# Patient Record
Sex: MALE | Race: AMERICAN INDIAN OR ALASKA NATIVE
[De-identification: names, ages, dates, MRNs, and addresses within clinical notes are randomized per-mention and may not be internally consistent; named-entity substitution may affect disease eponyms.]

---

## 2018-07-14 ENCOUNTER — HOSPITAL ENCOUNTER (INPATIENT)
Dept: HOSPITAL 42 - ED | Age: 83
LOS: 3 days | Discharge: HOME | DRG: 281 | End: 2018-07-17
Attending: FAMILY MEDICINE | Admitting: FAMILY MEDICINE
Payer: MEDICARE

## 2018-07-14 VITALS — BODY MASS INDEX: 27.8 KG/M2

## 2018-07-14 DIAGNOSIS — I25.10: ICD-10-CM

## 2018-07-14 DIAGNOSIS — J44.9: ICD-10-CM

## 2018-07-14 DIAGNOSIS — C34.90: ICD-10-CM

## 2018-07-14 DIAGNOSIS — I13.0: ICD-10-CM

## 2018-07-14 DIAGNOSIS — D68.69: ICD-10-CM

## 2018-07-14 DIAGNOSIS — Z79.899: ICD-10-CM

## 2018-07-14 DIAGNOSIS — N18.9: ICD-10-CM

## 2018-07-14 DIAGNOSIS — E78.00: ICD-10-CM

## 2018-07-14 DIAGNOSIS — K21.9: ICD-10-CM

## 2018-07-14 DIAGNOSIS — R09.02: ICD-10-CM

## 2018-07-14 DIAGNOSIS — F03.90: ICD-10-CM

## 2018-07-14 DIAGNOSIS — I21.4: Primary | ICD-10-CM

## 2018-07-14 DIAGNOSIS — G45.9: ICD-10-CM

## 2018-07-14 DIAGNOSIS — Z87.01: ICD-10-CM

## 2018-07-14 DIAGNOSIS — I48.91: ICD-10-CM

## 2018-07-14 DIAGNOSIS — Z87.442: ICD-10-CM

## 2018-07-14 DIAGNOSIS — I50.9: ICD-10-CM

## 2018-07-14 DIAGNOSIS — D64.9: ICD-10-CM

## 2018-07-14 DIAGNOSIS — Z90.49: ICD-10-CM

## 2018-07-14 DIAGNOSIS — Z79.82: ICD-10-CM

## 2018-07-14 DIAGNOSIS — Z79.01: ICD-10-CM

## 2018-07-14 DIAGNOSIS — Z87.891: ICD-10-CM

## 2018-07-14 LAB
ALBUMIN SERPL-MCNC: 3.8 G/DL (ref 3–4.8)
ALBUMIN/GLOB SERPL: 1.3 {RATIO} (ref 1.1–1.8)
ALT SERPL-CCNC: 22 U/L (ref 7–56)
APTT BLD: 33.7 SECONDS (ref 25.1–36.5)
AST SERPL-CCNC: 19 U/L (ref 17–59)
BASOPHILS # BLD AUTO: 0.01 K/MM3 (ref 0–2)
BASOPHILS NFR BLD: 0.1 % (ref 0–3)
BUN SERPL-MCNC: 34 MG/DL (ref 7–21)
CALCIUM SERPL-MCNC: 11.6 MG/DL (ref 8.4–10.5)
EOSINOPHIL # BLD: 0.1 10*3/UL (ref 0–0.7)
EOSINOPHIL NFR BLD: 0.7 % (ref 1.5–5)
ERYTHROCYTE [DISTWIDTH] IN BLOOD BY AUTOMATED COUNT: 15.5 % (ref 11.5–14.5)
GFR NON-AFRICAN AMERICAN: 41
GRANULOCYTES # BLD: 6.3 10*3/UL (ref 1.4–6.5)
GRANULOCYTES NFR BLD: 75.5 % (ref 50–68)
HDLC SERPL-MCNC: 57 MG/DL (ref 29–60)
HGB BLD-MCNC: 15.8 G/DL (ref 14–18)
INR PPP: 2.71 (ref 0.93–1.08)
LDLC SERPL-MCNC: 56 MG/DL (ref 0–129)
LYMPHOCYTES # BLD: 1.1 10*3/UL (ref 1.2–3.4)
LYMPHOCYTES NFR BLD AUTO: 13.4 % (ref 22–35)
MCH RBC QN AUTO: 27.2 PG (ref 25–35)
MCHC RBC AUTO-ENTMCNC: 34.3 G/DL (ref 31–37)
MCV RBC AUTO: 79.3 FL (ref 80–105)
MONOCYTES # BLD AUTO: 0.9 10*3/UL (ref 0.1–0.6)
MONOCYTES NFR BLD: 10.3 % (ref 1–6)
PLATELET # BLD: 129 10^3/UL (ref 120–450)
PMV BLD AUTO: 10.4 FL (ref 7–11)
PROTHROMBIN TIME: 31.8 SECONDS (ref 9.4–12.5)
RBC # BLD AUTO: 5.81 10^6/UL (ref 3.5–6.1)
TROPONIN I SERPL-MCNC: 0.13 NG/ML
WBC # BLD AUTO: 8.4 10^3/UL (ref 4.5–11)

## 2018-07-14 RX ADMIN — IPRATROPIUM BROMIDE AND ALBUTEROL SULFATE PRN ML: .5; 3 SOLUTION RESPIRATORY (INHALATION) at 17:18

## 2018-07-14 RX ADMIN — IPRATROPIUM BROMIDE AND ALBUTEROL SULFATE SCH: .5; 3 SOLUTION RESPIRATORY (INHALATION) at 21:05

## 2018-07-14 NOTE — CT
Date of service: 



07/14/2018



PROCEDURE:  CT HEAD WITHOUT CONTRAST.



HISTORY:

Code Stroke



COMPARISON:

None available. 



TECHNIQUE:

Axial computed tomography images were obtained through the head/brain 

without intravenous contrast.  



Radiation dose:



Total exam DLP = 969 mGy-cm.



This CT exam was performed using one or more of the following dose 

reduction techniques: Automated exposure control, adjustment of the 

mA and/or kV according to patient size, and/or use of iterative 

reconstruction technique.



FINDINGS:



HEMORRHAGE:

No intracranial hemorrhage. 



BRAIN:

No mass effect or edema.  Moderate atrophy



VENTRICLES:

Unremarkable. No hydrocephalus. 



CALVARIUM:

Unremarkable.



PARANASAL SINUSES:

Unremarkable as visualized. No significant inflammatory changes.



MASTOID AIR CELLS:

Unremarkable as visualized. No inflammatory changes.



OTHER FINDINGS:

The ER was notified at 11:15 a.m.



IMPRESSION:

No acute findings

## 2018-07-14 NOTE — CARD
--------------- APPROVED REPORT --------------





Date of service: 07/14/2018



EKG Measurement

Heart [id number]

CT [id number]

[id number]

[id number]

[id number]



<Conclusion>

Sinus rhythm with 1st degree AV block

Left axis deviation

Right bundle branch block

Abnormal ECG

## 2018-07-14 NOTE — CON
DATE:  07/14/2018



HISTORY OF PRESENT ILLNESS:  The patient is an 86-year-old male who

presents with nondescript neurologic changes this morning with an episode

of atypical chest pain.



PAST MEDICAL HISTORY:  The patient's past medical history is notable for

documented coronary disease.  He also suffers from hypercholesterolemia.



He has had a lung mass that was noted that is consistent with CA, has been

treated conservatively.



SOCIAL HISTORY:  The patient is a former smoker.



REVIEW OF SYSTEMS:  The patient is comfortable in the emergency room

without shortness of breath, without chest pain, without edema in the lower

extremities, without dizziness, without orthopnea.



PHYSICAL EXAMINATION:

VITAL SIGNS:  On physical exam, blood pressure is 119/77, heart rate is in

the 80s.

NECK:  Negative JVD.

LUNGS:  Without rales.

HEART:  S1, S2.

EXTREMITIES:  Without edema.



LABORATORY DATA:  EKG shows no acute changes.  BUN and creatinine is 34 and

1.6.  Troponin is 0.13.  Hemoglobin is 15.



IMPRESSION:

1.  Nonspecific neurologic symptoms.

2.  No evidence for acute stroke.

3.  Mildly elevated troponins consistent with non-ST elevation myocardial

infarction.

4.  Lung mass.

5.  History of hypercholesterolemia.



Given these findings, the patient is anticoagulated on his Coumadin

already.



Aspirin has been ordered.  Beta-blockers has been ordered.





__________________________________________

Shaun Beebe MD







DD:  07/14/2018 12:46:42

DT:  07/14/2018 12:49:43

Job # [id number]

## 2018-07-14 NOTE — EDPD
HPI Stroke





- General


Time Seen by Provider: 07/14/18 10:22


Historian: Patient, Family





- History of Present Illness


Narrative History of Present Illness (Free Text): 





07/14/18 15:17


Patient is an 87 yo male, past medical history of lung cancer (family and 

patient decided "not to treat") presents with episode of shortness of breath 

and weakness "while in the shower" at approximatley 830 am. Patient was found 

by wife to be "very weak". When patient was seen by his daughter at 

approximately 900 am he was "slurring his speech". He initially denies headache

, denies chest pain, denies weakness to any specific arm or leg. Daughter 

states that his speech is now back to normal. No abdominal pain. No nausea. No 

dark or bloody stools.





rTPA Inclusion/Exclusion





- Refusal of Treatment


Patient Refused Treatment: Yes





- Warning to TPA With Conditions


Condition: Rapid Improvement





Allergies/Home Meds


Allergies/Adverse Reactions: 


Allergies





No Known Allergies Allergy (Verified 07/29/13 08:23)


 








Home Medications: 


 Home Meds











 Medication  Instructions  Recorded  Confirmed


 


Warfarin [Coumadin] 3 mg PO DAILY 05/19/16 07/14/18


 


Albuterol HFA [Ventolin HFA 90 0.09 mg IH BID 07/14/18 07/14/18





mcg/actuation (8 g)]   


 


Albuterol/Ipratropium [Duoneb 3 3 ml IH BID 07/14/18 07/14/18





MG/3 Ml-0.5 MG/3 Ml 3 Ml]   


 


Cholecalciferol [Vitamin D] 1,000 iu PO DAILY 07/14/18 07/14/18


 


Furosemide [Lasix] 20 mg PO DAILY 07/14/18 07/14/18


 


Metoprolol Succinate [Toprol Xl] 25 mg PO DAILY 07/14/18 07/14/18


 


Rivastigmine 9.5 mg/24 hr [Exelon 1 ea TD DAILY 07/14/18 07/14/18





9.5 mg Patch]   


 


traMADol [Ultram] 50 mg PO PRN PRN 07/14/18 07/14/18














Review of Systems





- Review of Systems


Constitutional: Fatigue.  absent: Fevers


Eyes: absent: Vision Changes


ENT: absent: Hearing Changes


Respiratory: SOB.  absent: Cough


Cardiovascular: absent: Chest Pain, Palpitations, Edema, MCCALL


Gastrointestinal: absent: Abdominal Pain, Nausea, Vomiting


Genitourinary Male: absent: Dysuria


Musculoskeletal: absent: Back Pain


Skin: absent: Rash


Neurological: Dizziness, Speech Changes.  absent: Headache, Focal Weakness, 

Seizure


Endocrine: absent: Polyuria


Hemo/Lymphatic: absent: Easy Bleeding


Psychiatric: absent: Depression





ED Stroke Physical Exam


Vital Signs Reviewed: Yes


Temperature: Afebrile


Appearance: Positive for: Ill-Appearing


Pain Distress: Mild


Mental Status: Positive for: Alert and Oriented X 3





- Systems Exam


Head: Present: Atraumatic, Normocephalic


Pupils: Present: PERRL


Mouth: Present: Dry


Pharnyx: No: ERYTHEMA


Nose (Internal): Present: Normal Inspection


Neck: Present: Normal Range of Motion.  No: Meningeal Signs, JVD


Respiratory/Chest: Present: Rales, Rhonchi.  No: Respiratory Distress, 

Tachypneic


Cardiovascular: Present: Regular Rate and Rhythm, Murmurs


Abdomen: No: Tenderness, Distention


Rectal: No: Gross Blood


Back: No: CVA Tenderness


Upper Extremity: No: Edema


Lower Extremity: Present: Neurovascularly Intact.  No: CALF TENDERNESS


Neurologic: Present: Speech Normal, Motor Func Grossly Intact, Normal Sensory 

Function, Memory Normal.  No: Pronator Drift, Facial Droop, Dysmetric Finger to 

Nose


Skin: Present: Warm


Psychiatric: Present: Alert, Normal Insight, Normal Concentration





Medical Decision Making


ED Course and Treatment: 





07/14/18 10:30


Patient is an 87 yo male who presented with wife and daughter. History 

supplemented by daughter.


He was noted to have slurred speech by daughter at approximately 900 am today 

after wife had reported that she did not appreciate this slurred speech at 830.


CODE STROKE ACTIVATED





07/14/18 11:53


CT of Head reviewed by radiologist, shows no acute findings,


Chest X-Ray reviewed by radiologist, shows no active disease. 


Although my interpretation of chest xray is left lower mass/infiltrate.


Patient on re-evaulation is at his baseline mental status with normal speech on 

re-evaluation.


He is NOT a tpa candidate as patient has rapid improvement and resolution of 

symptoms.


Patient evaluated by Dr. Jarrell neuro in ED.


Patient denies chest pain or shortness of breath with serial exams.


EKG reveals right bundle branch block with first degree av block.


No prior EKG for comparison.


Troponin elevated at 0.12.


He has no chest pain with serial exams.


Suspect possible nonstemi, cannot exclude PE.


PATIENT IS TAKING COUMADIN AND INR IS 2.7.


No active bleeding noted.


Patient evaluated by his cardiologist in ED as well as PMD Dr. BUBBA Reed.


Differential diagnosis reviewed with admitting team, will endorse vq scan to 

admitting team.


Patient's family updated with results, he is comfortable and denying shortness 

of breath.


07/14/18 15:45








- EKG Interpretation


EKG Interpretation (Text): 





EKG at 11:17 normal sinus rhythm with first degree av block rate of 72, left 

axis deviation, right bundle branch block





Interpreted by ED Physician: Yes


Type: 12 lead EKG





NIHSS Scale ([place])


Time Performed: 10:45





- How Severe is the Stoke


  ** Baseline


Level of Consciousness: 0=Alert


LOC to Questions: 0=Both comments correct


LOC to commands: 0=Obeys both correctly


Best Gaze: 0=Normal


Visual: 0=No visual loss


Facial: 0=Normal


Motor Arm - Left: 0=No drift


Motor Arm - Right: 0=No drift


Motor Leg - Left: 0=No drift


Motor Leg - Right: 0=No drift


Limb Ataxia: 0=Absent


Sensory: 0=Normal


Best Language: 0=No aphasia


Dysarthia: 0=Normal articulation


Extinction & Inattention (Neglect): 0=Normal, no object


Score: 0


Risk Level: No Stroke Risk





Disposition/Present on Arrival





- Present on Arrival


Any Indicators Present on Arrival: Yes


History of DVT/PE: Yes





- Disposition


Have Diagnosis and Disposition been Completed?: Yes


Diagnosis: 


 Elevated troponin, TIA (transient ischemic attack), Dyspnea, Myocardial 

infarction, Renal insufficiency





Disposition: HOSPITALIZED


Disposition Time: 12:30


Patient Plan: Admission, Telemetry


Patient Problems: 


 Current Active Problems











Problem Status Onset


 


Dyspnea Acute  


 


Elevated troponin Acute  


 


Myocardial infarction Acute  


 


TIA (transient ischemic attack) Acute  











Condition: SERIOUS

## 2018-07-14 NOTE — RAD
Date of service: 



07/14/2018



HISTORY:

Code Stroke  



COMPARISON:

No prior. 



FINDINGS:



LUNGS:

No active pulmonary disease.



PLEURA:

No significant pleural effusion identified, no pneumothorax apparent.



CARDIOVASCULAR:

Normal.



OSSEOUS STRUCTURES:

No significant abnormalities.



VISUALIZED UPPER ABDOMEN:

Normal.



OTHER FINDINGS:

None.



IMPRESSION:

No active disease.

## 2018-07-15 LAB
ALBUMIN SERPL-MCNC: 3.4 G/DL (ref 3–4.8)
ALBUMIN/GLOB SERPL: 1.2 {RATIO} (ref 1.1–1.8)
ALT SERPL-CCNC: 29 U/L (ref 7–56)
AST SERPL-CCNC: 21 U/L (ref 17–59)
BUN SERPL-MCNC: 30 MG/DL (ref 7–21)
CALCIUM SERPL-MCNC: 11.1 MG/DL (ref 8.4–10.5)
ERYTHROCYTE [DISTWIDTH] IN BLOOD BY AUTOMATED COUNT: 15.2 % (ref 11.5–14.5)
GFR NON-AFRICAN AMERICAN: 48
HGB BLD-MCNC: 14.7 G/DL (ref 14–18)
MCH RBC QN AUTO: 26.6 PG (ref 25–35)
MCHC RBC AUTO-ENTMCNC: 33.8 G/DL (ref 31–37)
MCV RBC AUTO: 78.8 FL (ref 80–105)
PLATELET # BLD: 133 10^3/UL (ref 120–450)
PMV BLD AUTO: 10.5 FL (ref 7–11)
RBC # BLD AUTO: 5.52 10^6/UL (ref 3.5–6.1)
WBC # BLD AUTO: 7.9 10^3/UL (ref 4.5–11)

## 2018-07-15 RX ADMIN — IPRATROPIUM BROMIDE AND ALBUTEROL SULFATE SCH ML: .5; 3 SOLUTION RESPIRATORY (INHALATION) at 19:20

## 2018-07-15 RX ADMIN — IPRATROPIUM BROMIDE AND ALBUTEROL SULFATE PRN ML: .5; 3 SOLUTION RESPIRATORY (INHALATION) at 17:48

## 2018-07-15 RX ADMIN — IPRATROPIUM BROMIDE AND ALBUTEROL SULFATE SCH ML: .5; 3 SOLUTION RESPIRATORY (INHALATION) at 01:05

## 2018-07-15 RX ADMIN — IPRATROPIUM BROMIDE AND ALBUTEROL SULFATE SCH ML: .5; 3 SOLUTION RESPIRATORY (INHALATION) at 07:24

## 2018-07-15 RX ADMIN — IPRATROPIUM BROMIDE AND ALBUTEROL SULFATE PRN ML: .5; 3 SOLUTION RESPIRATORY (INHALATION) at 10:53

## 2018-07-15 RX ADMIN — METHYLPREDNISOLONE SODIUM SUCCINATE SCH MG: 40 INJECTION, POWDER, FOR SOLUTION INTRAMUSCULAR; INTRAVENOUS at 13:44

## 2018-07-15 RX ADMIN — BUDESONIDE SCH MG: 0.5 SUSPENSION RESPIRATORY (INHALATION) at 19:19

## 2018-07-15 RX ADMIN — METHYLPREDNISOLONE SODIUM SUCCINATE SCH MG: 40 INJECTION, POWDER, FOR SOLUTION INTRAMUSCULAR; INTRAVENOUS at 21:54

## 2018-07-15 RX ADMIN — IPRATROPIUM BROMIDE AND ALBUTEROL SULFATE SCH ML: .5; 3 SOLUTION RESPIRATORY (INHALATION) at 13:18

## 2018-07-15 RX ADMIN — METOPROLOL SUCCINATE SCH MG: 25 TABLET, EXTENDED RELEASE ORAL at 10:57

## 2018-07-15 NOTE — HP
HISTORY OF PRESENT ILLNESS:  I saw him in the emergency room on 07/14/2018,

and I dictated an H and P, but it did not populate.  I am re-dictating the

H and P on 07/15/2018, for 07/14/2018, when I saw him in the emergency

room.  He is an 86-year-old -American man whom I know very well from

house calls for many years.  He has a history of lung cancer and the family

has tried not to treat.  He has episodes of shortness of breath.  He is

supposed to be on oxygen at home 24 x 7, but the biggest problem is he

takes it off all the time.  He then had some slurred speech at home.  No

specific weakness.  They took him to the emergency room.  I understand he

became code stroke without deficits.



PAST MEDICAL HISTORY:  Lung cancer; COPD; low vitamin D; CHF; hypertension;

dementia; low back pain; atrial fibrillation, on Coumadin.



ALLERGIES:  HE HAS NO KNOWN DRUG ALLERGIES.



REVIEW OF SYSTEMS:  At this time, he is pleasant in the emergency room.  No

deficits.  Alert, comfortable.  No chest pain.  No shortness of breath.  No

abdominal pain.  No fatigue.  No fevers.  No vision or hearing changes.  He

is not short of breath anymore.  No cough.  Now, he is on oxygen.  No chest

pain or palpitation.  No edema, dyspnea on exertion.  No nausea, vomiting,

constipation, or diarrhea.  No problems urinating.  No back pain.  No

issues with skin issues.  He had dizziness.  He had some speech changes

early when he was off the oxygen.  He normally sats between 92% and 93% at

home on 2 to 3 L of oxygen.  No headache or focal weakness.  No problems

urinating.  No bleeding.  No depression.  He is pleasantly confused.



PHYSICAL EXAMINATION:

VITAL SIGNS:  He had a 97.8 temperature, 74 pulse, 112/74 blood pressure,

90% O2 sat on room air.

HEENT:  His head is atraumatic, normocephalic.  Extraocular muscles are

intact.  Pupils are equal and reactive to light.  Throat is dry.

HEART:  Regular rate at this time.

LUNGS:  Decreased breath sounds, but clear to auscultation.  No wheezes. 

No rhonchi.  No rales.

NECK:  Supple.

ABDOMEN:  Soft, nontender.  Positive bowel sounds.  No guarding.  No

rebound.  No CVA tenderness.

RECTAL:  He has had a rectal exam in the ER with no blood.

EXTREMITIES:  No edema.  He can move all 4 extremities well.

NEUROLOGIC:  Speech is normal.  Grossly intact.  This is his baseline.  By

the time I got to him, I missed all the slurred speech  which was

happened before when he was off his oxygen.  He is alert.  Normal insight. 

He has some dementia, but he is back to his baseline.



LABORATORY DATA:  CAT scan showed no acute findings.  EKG was normal sinus

rhythm.  Score on the stroke protocol was 0.  No stroke risk at this time. 

He did have a 140 sodium, potassium 4.5.  BUN 34, creatinine 1.6.  GFR is

41.  Sugar is 98.  Calcium is 11.6.  Total bili is 0.9, AST is 19, ALT is

22, alk phos 73.  His troponin's are 0.13.  Total protein 6.8, albumin 3.8,

globulin 3.  Triglyceride 70, cholesterol 133.  White count is 8.4,

hemoglobin 15.8, hematocrit is 129.  INR is good at 2.71.



He will have consults with Cardiology and Neurology.  He will be put on

aspirin, Coumadin.  He has nebulizer treatment.  He has Exelon patch,

Lasix, Lipitor, metoprolol, and Ultram.  He is here for a change in

mentation, possible stroke, unlikely positive troponin's and shortness of

breath.  He has a history of lung cancer and change in mentation.







__________________________________________

Tae Reed DO



DD:  07/15/2018 8:47:01

DT:  07/15/2018 10:12:11

Job # [id number]

MTDD

## 2018-07-15 NOTE — NM
Date of service: 



07/14/2018



COMPARISON:





TECHNIQUE:

33.0 mCi technetium 99-m  DTPA aerosol. 



4.0 mCI technetium 99-m MAA administered intravenously.



FINDINGS:



VENTILATION COMPONENT:

Heterogeneous distribution consistent with COPD



PERFUSION COMPONENT:

Normal.



The V rad report indicated an intermediate probability of pulmonary 

embolus. I believe the scan is low probability. In particular the 

perfusion study shows no significant defects 



IMPRESSION:

Lowprobability ventilation perfusion scan for pulmonary embolism.

## 2018-07-15 NOTE — CP.PCM.CON
History of Present Illness





- History of Present Illness


History of Present Illness: 





   Patient came in as a code stroke yesterday, and patient was seen about 15 

minutes after code was called. On my examination, 's symptoms had 

resolved and patient was not a TPA candidate. Today he is neurologically the 

same, with no deficits. His history is as follows:  was well until 

yesterday afternoon, when he was observed to have dysarthria, which was 

resolved by the time he came to the ER. The patient has dementia and he is not 

able to give a good history but it is obtained from the family. He is an 86 yr 

old male with pmh of lung cancer ( not receiving any treatment ), who had 

weakness and dysarthria while in the shower at 830 am, about 2 hours before 

presenting to the ER. His wife found him to be quite weak, and he was brought 

to the hospital. There is no history of prior stroke, head trauma or mva. He 

has not had an MRI Brain in the past. Of note, the patient is on coumadin for a 

DVT. 





PMH/PSH: as above. 


FH/SH: no tobacco, no etoh.  . Has several children. 


All: nkda. 





on exam: 


MMS: 20/30. Has difficulty with calculations and the date, as well as written 

commands. There is no alexia, however. 


PERRL. CN 2-12 normal. speech fluent. can name and repeat. Motor: strength 

normal. 


Sensory: normal. no deficits. 


gait not tested. 


+2 dtr ul and ll bl. Toes downgoing. No clonus. 


no facial asymmetry. '


NIH stroke scale: 0











Past Patient History





- Infectious Disease


Hx of Infectious Diseases: None





- Past Social History


Smoking Status: Never Smoked





- CARDIAC


Hx Cardiac Disorders: Yes (cad)


Other/Comment: DVT rt leg





- PULMONARY


Hx Respiratory Disorders: Yes (lung ca dx 1/22/18 [facility] [place])


Hx Chronic Obstructive Pulmonary Disease (COPD): Yes


Hx Emphysema: Yes


Hx Pneumonia: Yes


Other/Comment: family and patient decided not to treat





- NEUROLOGICAL


Hx Neurological Disorder: No





- HEENT


Hx HEENT Problems: Yes (eyeglasses)





- RENAL


Hx Chronic Kidney Disease: Yes


Hx Kidney Stones: Yes





- ENDOCRINE/METABOLIC


Hx Endocrine Disorders: No





- HEMATOLOGICAL/ONCOLOGICAL


Hx Blood Disorders: Yes


Hx Cancer: Yes (lll lung ca)





- INTEGUMENTARY


Hx Dermatological Problems: No





- MUSCULOSKELETAL/RHEUMATOLOGICAL


Hx Falls: No





- GASTROINTESTINAL


Hx Gastrointestinal Disorders: Yes


Hx Gastroesophageal Reflux: Yes





- GENITOURINARY/GYNECOLOGICAL


Hx Genitourinary Disorders: No





- PSYCHIATRIC


Hx Substance Use: No





- SURGICAL HISTORY


Hx Surgeries: Yes


Hx Cardiac Catheterization: Yes ("yrs ago")


Hx Cholecystectomy: Yes





- ANESTHESIA


Hx Anesthesia: No





Meds


Allergies/Adverse Reactions: 


 Allergies











Allergy/AdvReac Type Severity Reaction Status Date / Time


 


No Known Allergies Allergy   Verified 07/29/13 08:23














- Medications


Medications: 


 Current Medications





Albuterol/Ipratropium (Duoneb 3 Mg/0.5 Mg (3 Ml) Ud)  3 ml IH [id number] [facility]


   Last Admin: 07/15/18 07:24 Dose:  3 ml


Albuterol/Ipratropium (Duoneb 3 Mg/0.5 Mg (3 Ml) Ud)  3 ml IH Q2H PRN


   PRN Reason: Shortness of Breath


   Last Admin: 07/14/18 17:18 Dose:  3 ml


Aspirin (Aspirin Chewable)  81 mg PO DAILY [facility]


Atorvastatin Calcium (Lipitor)  20 mg PO DIN [facility]


   Last Admin: 07/14/18 17:15 Dose:  20 mg


Furosemide (Lasix)  20 mg IVP DAILY [facility]


   Last Admin: 07/14/18 12:31 Dose:  20 mg


Metoprolol Succinate (Toprol Xl)  25 mg PO DAILY [facility]


Rivastigmine (Exelon 9.5 Mg/24 Hr Patch)  1 patch TD DAILY [facility]


Tramadol HCl (Ultram)  50 mg PO DAILY PRN


   PRN Reason: Pain, severe (8-10)


   Last Admin: 07/14/18 21:07 Dose:  50 mg


Warfarin Sodium (Coumadin)  5 mg PO DAILY [facility]


   PRN Reason: Protocol











Results





- Vital Signs


Recent Vital Signs: 


 Last Vital Signs











Temp  97.3 F L  07/15/18 06:00


 


Pulse  76   07/15/18 06:00


 


Resp  19   07/15/18 06:00


 


BP  114/62   07/15/18 06:00


 


Pulse Ox  94 L  07/15/18 06:00














- Labs


Result Diagrams: 


 07/15/18 06:30





 07/15/18 06:45


Labs: 


 Laboratory Results - last 24 hr











  07/14/18 07/14/18 07/15/18





  14:47 21:54 06:30


 


WBC    7.9


 


RBC    5.52


 


Hgb    14.7


 


Hct    43.5


 


MCV    78.8 L


 


MCH    26.6


 


MCHC    33.8


 


RDW    15.2 H


 


Plt Count    133


 


MPV    10.5


 


Sodium   


 


Potassium   


 


Chloride   


 


Carbon Dioxide   


 


Anion Gap   


 


BUN   


 


Creatinine   


 


Est GFR ( Amer)   


 


Est GFR (Non-Af Amer)   


 


POC Glucose (mg/dL)   


 


Random Glucose   


 


Calcium   


 


Total Bilirubin   


 


AST   


 


ALT   


 


Alkaline Phosphatase   


 


Troponin I  0.13 H*  0.12 


 


Total Protein   


 


Albumin   


 


Globulin   


 


Albumin/Globulin Ratio   














  07/15/18 07/15/18





  06:45 07:14


 


WBC  


 


RBC  


 


Hgb  


 


Hct  


 


MCV  


 


MCH  


 


MCHC  


 


RDW  


 


Plt Count  


 


MPV  


 


Sodium  137 


 


Potassium  4.2 


 


Chloride  109 H 


 


Carbon Dioxide  20 L 


 


Anion Gap  13 


 


BUN  30 H 


 


Creatinine  1.4 


 


Est GFR ( Amer)  58 


 


Est GFR (Non-Af Amer)  48 


 


POC Glucose (mg/dL)   82


 


Random Glucose  88 


 


Calcium  11.1 H 


 


Total Bilirubin  0.9 


 


AST  21 


 


ALT  29 


 


Alkaline Phosphatase  61 


 


Troponin I  


 


Total Protein  6.2 


 


Albumin  3.4 


 


Globulin  2.9 


 


Albumin/Globulin Ratio  1.2 














- Imaging and Cardiology


  ** CT scan - head


Status: Image reviewed by me, Report reviewed by me (normal)





Assessment & Plan





- Assessment and Plan (Free Text)


Assessment: 





   86 yr old male who is by nature hypercoagulable due to lung cancer and also 

is on coumadin, and may have had TIA. We will do stroke workup at this time. 





Plan: 


1. ECHO


2. INR is therapeutic at 2.7


3. MRI brain tomorrow


4. Bp may be controlled. 


5. CTA head and neck would be advisable. 





If Dr. rich is on consult, please consult this group. 


Or we will follow


Thank you


Dr. summers

## 2018-07-15 NOTE — PN
DATE:  07/15/2018



SUBJECTIVE:  I saw Herson resting comfortably in bed.  He ate 100% of his

breakfast.  He is feeling well.  No chest pain.  No shortness of breath. 

No abdominal pain.  No weakness.  He can move all four extremities.  He is

taking aspirin, warfarin, DuoNeb, Exelon patch, Lasix, Lipitor, metoprolol,

and tramadol as needed.  He is in good spirits.



PHYSICAL EXAMINATION:

VITAL SIGNS:  He has a 97.8 temperature, 83 pulse, 119/73 blood pressure,

20 respiratory rate, 95% O2 sat on nasal cannula.

HEENT:  His head is atraumatic, normocephalic.

HEART:  Regular rate.

LUNGS:  Decreased breath sounds, but clear to auscultation.  No wheezes, no

rhonchi, no rales.

ABDOMEN:  Soft, nontender.  Positive bowel sounds.  No guarding, no

rebound, no CVA tenderness.

EXTREMITIES:  No edema.

NEUROLOGIC:  He can move all four extremities equally.  He can smile.  He

can put his tongue out midline.  He is back to his baseline, completely

normal.



LABORATORY DATA:  He had a 137 sodium, potassium 4.2, BUN is 30, creatinine

1.4, GFR is 58, sugar is 82, calcium is 11.1.  Total bilirubin is 0.9, AST

is 21, ALT is 29, alkaline phosphatase 61.  His third troponin dropped to

0.12.  Total protein is 6.2, albumin is 3.4.  INR is 2.71.  He has a 7.9

white count, 14.7 hemoglobin, 43.5 hematocrit with 138 platelets.



There are consults for Cardiology, Neurology, and Physical Therapy.  Get

him out of bed to chair.  If he does well, hopefully, maybe tomorrow we can

discharge him.  He had an non-ST elevation myocardial infarction, history

of left lower lung mass, cancer, change in mentation, positive troponins,

and he wants a code stroke  anything.







__________________________________________

Tae Rede DO



DD:  07/15/2018 8:50:23

DT:  07/15/2018 9:51:39

Job # [id number]

DENISE

## 2018-07-15 NOTE — PN
DATE:  07/15/2018



CARDIOLOGY FOLLOWUP



SUBJECTIVE:  The patient is dyspneic without chest pain.



PHYSICAL EXAMINATION:

VITAL SIGNS:  Blood pressure is 114/62 with the heart rate is in the 70s.

NECK:  Negative JVD.

LUNGS:  Decreased breath sounds with rhonchi at the bases.

HEART:  Reveals S1, S2.

EXTREMITIES:  Without edema.



LABORATORY DATA:  Hemoglobin is 14.7.  Chemistries:  Troponins are 0.012,

creatinine is _____.  Lung scan is pending.



IMPRESSION:

1.  Dyspnea.

2.  Non-ST-elevation myocardial infarction.

3.  Lung cancer.

4.  History of hypercholesterolemia.

5.  Coronary artery disease.

6.  The patient well anticoagulated on Coumadin.



PLAN:  Given these findings, the patient's probability of pulmonary

embolism is low given his full anticoagulation.  We will start him on Lasix

40 IV b.i.d.



I had an extensive discussion with the patient and daughter about how

aggressive to be in terms of his non-STEMI.  They agree and with no

invasive procedures at this time with conservative therapy only.  This

applies both to his lung CA as well as his non-STEMI.





__________________________________________

Shaun Beebe MD





DD:  07/15/2018 10:57:36

DT:  07/15/2018 11:01:00

Job # [id number]

## 2018-07-16 LAB
ALBUMIN SERPL-MCNC: 3.6 G/DL (ref 3–4.8)
ALBUMIN/GLOB SERPL: 1.3 {RATIO} (ref 1.1–1.8)
ALT SERPL-CCNC: 23 U/L (ref 7–56)
AST SERPL-CCNC: 17 U/L (ref 17–59)
BUN SERPL-MCNC: 34 MG/DL (ref 7–21)
CALCIUM SERPL-MCNC: 11.7 MG/DL (ref 8.4–10.5)
ERYTHROCYTE [DISTWIDTH] IN BLOOD BY AUTOMATED COUNT: 15.1 % (ref 11.5–14.5)
GFR NON-AFRICAN AMERICAN: 48
HGB BLD-MCNC: 15.8 G/DL (ref 14–18)
INR PPP: 4.37 (ref 0.93–1.08)
MCH RBC QN AUTO: 27.3 PG (ref 25–35)
MCHC RBC AUTO-ENTMCNC: 35.3 G/DL (ref 31–37)
MCV RBC AUTO: 77.5 FL (ref 80–105)
PLATELET # BLD: 141 10^3/UL (ref 120–450)
PMV BLD AUTO: 10.3 FL (ref 7–11)
PROTHROMBIN TIME: 51.8 SECONDS (ref 9.4–12.5)
RBC # BLD AUTO: 5.78 10^6/UL (ref 3.5–6.1)
WBC # BLD AUTO: 5.9 10^3/UL (ref 4.5–11)

## 2018-07-16 RX ADMIN — IPRATROPIUM BROMIDE AND ALBUTEROL SULFATE SCH ML: .5; 3 SOLUTION RESPIRATORY (INHALATION) at 07:39

## 2018-07-16 RX ADMIN — IPRATROPIUM BROMIDE AND ALBUTEROL SULFATE SCH: .5; 3 SOLUTION RESPIRATORY (INHALATION) at 01:08

## 2018-07-16 RX ADMIN — METHYLPREDNISOLONE SODIUM SUCCINATE SCH MG: 40 INJECTION, POWDER, FOR SOLUTION INTRAMUSCULAR; INTRAVENOUS at 06:17

## 2018-07-16 RX ADMIN — IPRATROPIUM BROMIDE AND ALBUTEROL SULFATE SCH ML: .5; 3 SOLUTION RESPIRATORY (INHALATION) at 19:40

## 2018-07-16 RX ADMIN — BUDESONIDE SCH MG: 0.5 SUSPENSION RESPIRATORY (INHALATION) at 19:40

## 2018-07-16 RX ADMIN — IPRATROPIUM BROMIDE AND ALBUTEROL SULFATE SCH ML: .5; 3 SOLUTION RESPIRATORY (INHALATION) at 14:00

## 2018-07-16 RX ADMIN — METHYLPREDNISOLONE SODIUM SUCCINATE SCH MG: 40 INJECTION, POWDER, FOR SOLUTION INTRAMUSCULAR; INTRAVENOUS at 14:27

## 2018-07-16 RX ADMIN — BUDESONIDE SCH MG: 0.5 SUSPENSION RESPIRATORY (INHALATION) at 07:39

## 2018-07-16 RX ADMIN — METHYLPREDNISOLONE SODIUM SUCCINATE SCH MG: 40 INJECTION, POWDER, FOR SOLUTION INTRAMUSCULAR; INTRAVENOUS at 21:07

## 2018-07-16 NOTE — PN
DATE:  07/16/2018



SUBJECTIVE:   He is resting in bed comfortably.  He slept very well last

night.  He is feeling much better.  No deficits.  No problems.



MEDICATIONS:  He is on aspirin, albuterol, Exelon patch, Lasix, Lipitor,

Pulmicort, Solu-Medrol 30 IV every 8 hours, I will drop it down to  20;

metoprolol and Ultram.



PHYSICAL EXAMINATION:

VITAL SIGNS:  He has a 97.4 temperature, 81 pulse, 120/80 blood pressure,

19 respiratory rate, 95% O2 sat on 5 liters.  HEENT:  His head is

atraumatic, normocephalic.

HEART:  Regular rate.

LUNGS:  Decreased breath sounds, but clear.

ABDOMEN:  Soft.

EXTREMITIES:  No edema.



LABORATORY DATA:  He has a 137 sodium, potassium is 4.9, BUN is 34,

creatinine 1.4, GFR is 48, sugar is 152, calcium is 11.7.  Total bili is

0.6, AST is 17, ALT is 23, alk phos 72.  Troponin I was less than 0.12,

albumin is 6.3.  INR went to 4.37.  I will hold the Coumadin.  He is not

bleeding.  He has 5.9 white count, 15.8 hemoglobin, 44.8 hematocrit with

141 platelets.



He has been seen by Cardiology and Neurology.  They are waiting for MRI of

the brain, 2-D echo and carotids.  I am hoping if they are okay and

physical therapy says he could walk,  he could be discharged later today

back home.



DIAGNOSES:  He came in with elevated troponins, change in mentation, left

lower lung mass which is chronic and he had an non-ST elevation myocardial

infarction.





__________________________________________

Tae Reed DO







DD:  07/16/2018 7:48:54

DT:  07/16/2018 7:50:52

Job # [id number]

## 2018-07-16 NOTE — PN
DATE:  07/16/2018



CARDIOLOGY FOLLOWUP



SUBJECTIVE:  The patient is still intermittently dyspneic.  The pressure

earlier this morning was low after Lasix.  Lasix has been held.  Currently,

the patient is in no acute distress.



PHYSICAL EXAMINATION:

VITAL SIGNS:  Blood pressure 120/80, heart rate is in the 80s.

NECK:  Negative JVD.

LUNGS:  Bilateral rhonchi.

HEART:  Reveal S1, S2.

EXTREMITIES:  Without edema.



LABORATORY DATA:  Hemoglobin is 15.8, BUN and creatinine up to 34 and 1.4,

the glucose is 152.



CT scan of the chest revealed no change in the lung mass.  There is severe

emphysema.



IMPRESSION:

1.  Severe chronic obstructive pulmonary disease.

2.  Dyspnea.

3.  Lung cancer.

4.  Non-ST-elevation myocardial infarction.

5.  Coronary artery disease.

6.  Anemia.

7.  Weakness.



PLAN:  Given these findings, we will hold off his Lasix.



In addition, I have reconfirmed with the patient's family that no invasive

cardiac procedures will be done on the patient.  We will stop his Lasix and

continue to monitor on telemetry for 24 hours.





__________________________________________

Shaun Beebe MD



DD:  07/16/2018 13:22:44

DT:  07/16/2018 13:26:00

Job # [id number]

## 2018-07-16 NOTE — CON
DATE:  07/15/2018PULMONARY CONSULTATION



LOCATION:  Room 263, bed 2.



HISTORY OF PRESENT ILLNESS:  The patient has longstanding chronic

obstructive pulmonary disease.  He was admitted to the hospital through the

emergency room for shortness of breath.  He was found to have an acute

myocardial infarction.  He has a history of lung cancer in the past,

evidently this was not treated, the time span of this is not clear.  The

patient was evidently experiencing a cerebrovascular accident at the time

of being in the emergency room.  Although, now he is awake and alert in his

room, able to answer questions fully.



PAST MEDICAL HISTORY:  History of lung cancer as described above, chronic

obstructive pulmonary disease, dementia and atrial fibrillation, on

Coumadin.



ALLERGIES:  NO KNOWN ALLERGIES.



HOME MEDICATIONS:  Unclear.



SOCIAL HISTORY:  Former smoker.  No occupational exposure.  No travel

history.



FAMILY HISTORY:  Coronary artery disease.



REVIEW OF SYSTEMS:  There were no abnormalities noted other than that

described above.  He has a history of issues with his heart in the past. 

His COPD and lung cancer is documented.  All other systems negative.



PHYSICAL EXAMINATION

GENERAL:  The patient is comfortable in bed, in no acute distress.

VITAL SIGNS:  Stable.  He remains afebrile at 98.2, pulse 70, respiratory

rate 16, blood pressure 120/70.  O2 sat 90% on room air, on supplemental

oxygen 96%.

HEENT:  Normocephalic, atraumatic.  EOMs full.  Conjunctivae pink.

NECK:  Supple.  No jugular venous distention.  No bruit, no mass, no

thyromegaly.

HEART:  Regular rhythm.  S1, S2 without murmur, gallop or rub.

CHEST:  Global decrease in breath sounds.  No wheezing noted.  Minimal

rhonchi noted.  No rales appreciated.  ABDOMEN:  Soft.  Bowel sounds

normoactive without mass, guarding, rebound or organomegaly.

EXTREMITIES:  Reveal no clubbing, cyanosis or edema.

NEUROLOGIC:  Reveals no focal findings.

SKIN:  Normal without rash or excoriation.  Lymphadenopathy is not present.



LABORATORY DATA:  Chest x-ray was clear, although underlying "lung cancer"

is not seen.  A CAT scan would be necessary to further evaluate.  EKG;

sinus rhythm, nonspecific ST-T wave changes.  Blood work shows a normal

electrolytes with a BUN of 34, creatinine of 1.6 and calcium is 11.6,  white 
count 8000.  No other laboratory 

studies are available at this time.



IMPRESSION:

1.  Underlying chronic obstructive pulmonary disease.

2.  Underlying lung cancer, not seen on chest x-ray.

3.  Possible cerebrovascular accident, on Coumadin.



PLAN:  The patient is being evaluated by Cardiology and Neurology.  We will

discuss with Dr. Tae Reed from Internal Medicine to decide on the need

for further intervention.  In the meantime, however, the patient will be on

inhaled bronchodilators and corticosteroids and CAT scan requested.



Thank you for the opportunity to evaluate this patient.





__________________________________________

Angelo Austin MD



DD:  07/16/2018 8:46:10

DT:  07/16/2018 8:50:06

Job # [id number]



MTDD

## 2018-07-16 NOTE — CARD
--------------- APPROVED REPORT --------------





Date of service: 07/16/2018



EXAM: Two-dimensional and M-mode echocardiogram with Doppler and 

color Doppler.



INDICATION

STROKE



2D DIMENSIONS 

RVDd4.5   (2.9-3.5cm)IVSd1.1   (0.7-1.1cm)

LVDd4.0   (3.9-5.9cm)PWd1.2   (0.7-1.1cm)



M-Mode DIMENSIONS 

Aortic Root3.60   (2.2-3.7cm)Aortic Cusp Exc.1.80   (1.5-2.0cm)



Aortic Valve

AoV Peak Rfxugiiy360.0cm/Terrell Peak GR.7mmHg



Mitral Valve

E/A ratio0.0



TDI

E/Lateral E'0.0E/Medial E'0.0



Pulmonary Valve

PV Peak Eziebzrv91.4cm/sPV Peak Grad.1mmHg



Tricuspid Valve

TR Peak Iymrzsjy363wc/sRAP ZFNCXIJE87kvVbCX Peak Gr.72mmHg

IUUY43tlWy



 LEFT VENTRICLE 

The left ventricle is normal size. There is normal left ventricular 

wall thickness. The left ventricular function is low normal.EF-50-55 

There is a flattened septum consistent with right ventricle volume 

and pressure overload. The left ventricular diastolic function is 

normal. No left ventricle thrombus noted on this study. There is no 

ventricular septal defect visualized. There is no left ventricular 

aneurysm. There is no mass noted in the left ventricle.



 RIGHT VENTRICLE 

The right ventricle is severely dilated. The right ventricle is 

borderline hypertrophied. Systolic function of RV is moderately to 

severely reduced.



 ATRIA 

The left atrium size is normal. The right atrium is moderately 

dilated. The interatrial septum is intact with no evidence for an 

atrial septal defect.



 AORTIC VALVE 

The aortic valve is thickened but opens well. There is trace aortic 

regurgitation. There is no aortic valvular stenosis. There is no 

aortic valvular vegetation.



 MITRAL VALVE 

The mitral valve is thickened but opens well. Mitral regurgitation is 

trace to mild. There is no mitral valve stenosis. There is no 

evidence of mitral valve prolapse.



 TRICUSPID VALVE 

The tricuspid valve leaflets are thickened , but open well. There is 

moderate to severe tricuspid regurgitation.RVSP-81 mmof hg. There is 

severe pulmonary hypertension. There is no tricuspid valve stenosis. 

There is no tricuspid valve prolapse or vegetation.



 PULMONIC VALVE 

The pulmonic valve is borderline thickened. There is mild pulmonic 

valvular regurgitation. There is no pulmonic valvular stenosis.



 GREAT VESSELS 

The aortic root is normal in size. The ascending aorta is normal in 

size. The pulmonary artery is normal. The IVC is normal in size and 

collapses >50% with inspiration.



 PERICARDIAL EFFUSION 

There is no pleural effusion. There is no pericardial effusion.



<Conclusion>

The left ventricle is normal size.

There is normal left ventricular wall thickness.

The left ventricular function is low normal.EF-50-55

There is a flattened septum consistent with right ventricle volume 

and pressure overload.

The right ventricle is severely dilated.

Systolic function of RV is moderately to severely reduced.

There is trace aortic regurgitation.

Mitral regurgitation is trace to mild.

There is moderate to severe tricuspid regurgitation.RVSP-81 mmof hg.

There is severe pulmonary hypertension.

The IVC is normal in size and collapses >50% with inspiration.

There is no pericardial effusion.

## 2018-07-16 NOTE — US
PROCEDURE:  Bilateral carotid artery duplex ultrasound 



HISTORY:

Carotid stenosis CVA



PHYSICIAN(S):  Shaun Luna MD.



TECHNIQUE:

Duplex sonography and color-flow Doppler were used to evaluate the 

carotid bifurcations and limited segments of the vertebral arteries 

bilaterally.



FINDINGS:

There is mild to moderate diffuse smooth heterogeneous plaque noted 

at the carotid bifurcations bilaterally. The peak systolic velocity 

in the proximal right internal carotid artery is 46 cm/sec. This 

corresponds to a 20 to 39% proximal right ICA stenosis. Normal 

systolic velocities are noted in the proximal right external carotid 

artery. There is antegrade flow in the right vertebral artery.



The peak systolic velocity in the proximal left internal carotid 

artery is 53 cm/sec. This corresponds to a 20 to 39% proximal left 

ICA stenosis. Normal systolic velocities are noted in the proximal 

left external carotid artery. There is antegrade flow in the left 

vertebral artery.



IMPRESSION:

1. Bilateral 20-39% proximal ICA stenoses.



2. Antegrade flow in both vertebral arteries.

## 2018-07-16 NOTE — MRI
Date of service: 



07/16/2018



PROCEDURE:  MRI BRAIN WITHOUT CONTRAST



HISTORY:

Code stroke



COMPARISON:

None. 



TECHNIQUE:

Multiplanar, multisequence MR images of the brain were obtained 

without intravenous contrast enhancement.



FINDINGS:



HEMORRHAGE:

None



DWI:

No evidence of an acute or early subacute infarction.



BRAIN PARENCHYMA:

No mass effect or edema. There is moderate to severe atrophy.



VENTRICLES:

Unremarkable. No hydrocephalus.



CRANIUM:

Unremarkable.



ORBITS:

Grossly unremarkable.



PARANASAL SINUSES/MASTOIDS:

Clear



VASCULAR SYSTEM:

Skull base flow voids intact.



OTHER FINDINGS:

None. 



IMPRESSION:

No acute intracranial findings

## 2018-07-16 NOTE — CON
DATE:  07/16/2018



NEUROLOGY CONSULTATION



CHIEF COMPLAINT:  Transient slurred speech, generalized weakness.



HISTORY OF PRESENT ILLNESS:  This is an 86-year-old man with past medical

history of left lobe lung mass, COPD, AFib, cognitive impairment, on Exelon

patch, who came in for shortness of breath, generalized weakness following

tremor and was found to be very weak and questionable slurred speech.  He

underwent an MRI of the brain without contrast which showed no acute

intracranial abnormality.  His chest x-ray showed a left lower lobe mass. 

His blood pressures were on the lower side.  In addition, he had an

underlying NSTEMI which Cardiology is on board.  He is on aspirin 81 mg in

addition to Lipitor 20 mg p.o. daily.  No acute events overnight.  No focal

neurological deficits seen on neuro examination besides some evidence of

cognitive impairment.  He is mildly deconditioned.



PAST MEDICAL HISTORY:  As above.



SOCIAL HISTORY:  No illicit drug use, smoking or EtOH abuse at this time.



ALLERGIES:  NO KNOWN DRUG ALLERGIES.



FAMILY HISTORY:  Noncontributory.



MEDICATIONS:  Reviewed by nurse per reconciliation sheet.



REVIEW OF SYSTEMS:  Fourteen-point review of systems is negative except as

in the HPI.



LABORATORY DATA:  Sodium is 137, potassium 4.9, chloride 107, carbon

dioxide of 18, BUN of 34, creatinine 1.4, random glucose of 152, calcium is

11.7, which is elevated.



PHYSICAL EXAMINATION:

VITAL SIGNS:  Temperature of 97.4, pulse rate of 88, blood pressure 120/80,

respiratory rate of 19, oxygen saturation 95% by room air.

GENERAL:  The patient is sitting up in bed, in no acute distress.

HEENT:  Head is atraumatic, normocephalic.  PERRLA.  Extraocular muscles

intact.

NECK:  Supple.  No JVD.  No adenopathy noted.

LUNGS:  Clear to auscultation.  No adventitious sounds.

HEART:  S1 and S2.  Normal rate and rhythm.  No murmurs, rub, or gallops.

ABDOMEN:  Soft, nontender, nondistended.  Bowel sounds are present.

EXTREMITIES:  No clubbing.  No cyanosis.  Peripheral pulses 2+ felt

bilaterally.

NEUROLOGIC:  The patient is alert and oriented to person and place, not

much of month and year.  Recall after 5 minutes is 0/3.  Poor attention

span.  Slow thought process.  Cranial nerves II through XII are intact. 

Motor exam:  Moves all extremities equally.  Slightly increased tone

throughout.  No pronator drift seen.  Sensory exam:  Light touch, pinprick,

proprioception, vibration are intact.  DTRs are 2+ throughout and 1 at both

ankles.  Coordination:  Finger-to-nose intact.  No dysmetria noted.  Gait

is deferred for now.



ASSESSMENT AND PLAN:  This is an 86-year-old man with history of chronic

obstructive pulmonary disease, hypertension, history of atrial fibrillation

on Coumadin, who presented for shortness of breath, generalized weakness

and questionable transient slurred speech with secondary to transient

cerebral hypoperfusion of the brain from low blood pressures, in addition

had underlying non-ST elevation myocardial infarction.  Currently, he is on

aspirin and statin.  His MRI of the brain showed no acute intracranial

abnormality.  He is _____ deconditioned of his underlying history of lung

cancer.  At this time, recommend;

1.  Aspirin 81 mg, Lipitor 20 for stroke prevention.

2.  PT/OT as an outpatient at least 3 times a week for deconditioned state.

3.  Continue with his Exelon patch for his cognitive impairment.

4.  Follow up with Oncology as an outpatient in regards to the left lower

lung mass and continue with current present medical management.  He is

stable for discharge.



Thank you for this consult.





__________________________________________

Rahul Limon MD



DD:  07/16/2018 14:27:09

DT:  07/16/2018 15:33:50

Job # [id number]

## 2018-07-16 NOTE — CT
Date of service: 



07/16/2018



PROCEDURE:  CT Chest without contrast



HISTORY:

Hx iof Lung CA



COMPARISON:

05/01/2018



TECHNIQUE:

Contiguous axial images were obtained through the chest without 

intravenous contrast enhancement. Sagittal and coronal 

reconstructions were performed.







Radiation dose (DLP): 379 mGy-cm. 



This CT exam was performed using one or more of the following dose 

reduction techniques: Automated exposure control, adjustment of the 

mA and/or kV according to patient size, and/or use of iterative 

reconstruction technique.



FINDINGS:



LUNGS:

There is a mass in the left lower lobe that is unchanged in size. In 

the coronal plane this measures 4.4 cm in diameter.



There is emphysema which is severe in the upper lobes. Multiple bulla 

are seen 



MEDIASTINUM:

Unremarkable thoracic aorta. No aneurysm. Normal sized heart. Main 

pulmonary artery unremarkable. No vascular congestion. No 

lymphadenopathy. Coronary artery calcifications are seen 



PLEURA:

No pleural fluid. No pneumothorax.



BONES:

No fracture. No destructive lesion. 



UPPER ABDOMEN:

Grossly unremarkable.



OTHER FINDINGS:

None.



IMPRESSION:

Stable appearance of left lower lobe lung mass.



Severe emphysema



No evidence of metastatic disease

## 2018-07-17 VITALS — RESPIRATION RATE: 20 BRPM | DIASTOLIC BLOOD PRESSURE: 62 MMHG | TEMPERATURE: 97.4 F | SYSTOLIC BLOOD PRESSURE: 104 MMHG

## 2018-07-17 VITALS — OXYGEN SATURATION: 95 %

## 2018-07-17 VITALS — HEART RATE: 77 BPM

## 2018-07-17 LAB
ALBUMIN SERPL-MCNC: 3.6 G/DL (ref 3–4.8)
ALBUMIN/GLOB SERPL: 1.4 {RATIO} (ref 1.1–1.8)
ALT SERPL-CCNC: 29 U/L (ref 7–56)
AST SERPL-CCNC: 25 U/L (ref 17–59)
BUN SERPL-MCNC: 39 MG/DL (ref 7–21)
CALCIUM SERPL-MCNC: 11.6 MG/DL (ref 8.4–10.5)
ERYTHROCYTE [DISTWIDTH] IN BLOOD BY AUTOMATED COUNT: 15.1 % (ref 11.5–14.5)
GFR NON-AFRICAN AMERICAN: 52
HGB BLD-MCNC: 15.7 G/DL (ref 14–18)
INR PPP: 4.73 (ref 0.93–1.08)
MCH RBC QN AUTO: 27.1 PG (ref 25–35)
MCHC RBC AUTO-ENTMCNC: 34.6 G/DL (ref 31–37)
MCV RBC AUTO: 78.3 FL (ref 80–105)
PLATELET # BLD: 149 10^3/UL (ref 120–450)
PMV BLD AUTO: 10.7 FL (ref 7–11)
PROTHROMBIN TIME: 56.1 SECONDS (ref 9.4–12.5)
RBC # BLD AUTO: 5.8 10^6/UL (ref 3.5–6.1)
WBC # BLD AUTO: 10.8 10^3/UL (ref 4.5–11)

## 2018-07-17 RX ADMIN — METHYLPREDNISOLONE SODIUM SUCCINATE SCH MG: 40 INJECTION, POWDER, FOR SOLUTION INTRAMUSCULAR; INTRAVENOUS at 05:58

## 2018-07-17 RX ADMIN — METOPROLOL SUCCINATE SCH MG: 25 TABLET, EXTENDED RELEASE ORAL at 10:13

## 2018-07-17 RX ADMIN — IPRATROPIUM BROMIDE AND ALBUTEROL SULFATE SCH ML: .5; 3 SOLUTION RESPIRATORY (INHALATION) at 07:45

## 2018-07-17 RX ADMIN — BUDESONIDE SCH MG: 0.5 SUSPENSION RESPIRATORY (INHALATION) at 07:45

## 2018-07-17 RX ADMIN — IPRATROPIUM BROMIDE AND ALBUTEROL SULFATE SCH ML: .5; 3 SOLUTION RESPIRATORY (INHALATION) at 01:35

## 2018-07-17 RX ADMIN — IPRATROPIUM BROMIDE AND ALBUTEROL SULFATE SCH ML: .5; 3 SOLUTION RESPIRATORY (INHALATION) at 13:31

## 2018-07-17 NOTE — PN
DATE:  07/17/2018



PULMONARY PROGRESS NOTE



SUBJECTIVE:  The patient is feeling relatively well, has no complaints of

acute shortness of breath.  He is hospitalized for myocardial infarction. 

He has chronic obstructive pulmonary disease.  CT scan of his chest in fact

shows a large lung mass that was not treated in the past.  The past history

is unclear.  As the patient is unable to give further history, we will need

to discuss with primary medical doctor to find out the chronology of the

COPD and lung cancer.



PHYSICAL EXAMINATION:

GENERAL:  The patient is resting comfortably in no acute distress.

VITAL SIGNS:  Remain stable.  He is afebrile.  Respiratory rate 16, O2 sat

98% on room air.

NECK:  Supple.  No JVD.  No bruit.

HEART:  Regular rhythm.  S1, S2 without murmur, gallop or rub.

CHEST:  Global decrease in breath sounds.  No wheezing appreciated. 

Minimal rhonchi is still noted.  No rales.

ABDOMEN:  Soft.  Bowel sounds normoactive without mass, guarding, rebound

or organomegaly.

EXTREMITIES:  Reveals no clubbing, cyanosis or edema.  There is no Homans'

sign.

NEUROLOGIC:  No focal findings.

SKIN:  Dry; intact. 



LABORATORY DATA:  CT scan as discussed above.  Lung cancer was not seen on

the chest x-ray, but a CT scan did prove to show a lung mass.



IMPRESSION:

1.  Chronic obstructive pulmonary disease.

2.  Underlying lung cancer.

3.  Cerebrovascular accident.

4.  Myocardial infarction.



PLAN:  We need to obtain history about the chronic obstructive pulmonary

disease and lung cancer.  This was clearly not treated in the past, the

time spent is not clear.  We will discuss with Dr. Reed and see if there

is any additional information regarding this.  If the diagnosis was made,

then no diagnostic intervention is required.  If not, the patient will

require a tissue diagnosis of this lung mass.  Once the myocardial

infarction is stabilized, we will discuss this with you at length and

decide on the need for further evaluation and treatment at the appropriate

time in view of an acute myocardial infarction.







__________________________________________

Angelo Austin MD



DD:  07/17/2018 9:14:38

DT:  07/17/2018 9:17:10

Job # [id number]



MTDROSALIA

## 2018-07-17 NOTE — DS
HISTORY OF PRESENT ILLNESS:  He is resting comfortable out of bed to chair.

No chest pain.  No shortness of breath.  No abdominal pain.  He is eating

well.  He is walking well.  He is doing better than when he came in.



PHYSICAL EXAMINATION:

VITAL SIGNS:  He has a 97.6 temp, 87 pulse, 118/84 blood pressure, 18

respiratory rate, 95% O2 sat on room air.

HEENT:  His head is atraumatic, normocephalic.

HEART:  Regular rate.

LUNGS:  Decreased breath sounds, but clear.

ABDOMEN:  Soft.

EXTREMITIES:  No edema.



MEDICATIONS:  He is currently on aspirin; DuoNebs; Exelon; Lipitor;

Pulmicort; Solu-Medrol, which we could stop and put him on prednisone;

and Ultram.



LABORATORY DATA:  He has a 10.8 white count, 15.7 hemoglobin, 45.4

hematocrit with 149 platelets.  He has a 135 sodium, potassium 4.7, BUN 39,

creatinine 1.3, GFR is 52, sugar is 141, calcium is 11.6, AST is 25, ALT is

29, alk phos is 69.



ASSESSMENT AND PLAN:  Waiting for Dr. Beebe to come in.  If it is okay with

Dr. Beebe, we could discharge him home.  I think that would be fine.  He

will stop the Solu-Medrol.  I will put him on prednisone and I will follow

up on the outpatient when house calls.  He was here for change in

mentation.  I think he just had hypoxemia and a non-ST-elevation myocardial

infarction.





__________________________________________

Tae Reed DO





DD:  07/17/2018 10:46:39

DT:  07/17/2018 22:14:05

Job # [id number]

MTDROSALIA

## 2018-07-17 NOTE — PN
DATE:  07/17/2018



CARDIOLOGY FOLLOWUP



SUBJECTIVE:  The patient is without shortness of breath today.  No chest

pain noted.



PHYSICAL EXAMINATION:

VITAL SIGNS:  Blood pressure 104/62, the heart rate is in the 80s.

NECK:  Negative JVD.

LUNGS:  Without rales.

HEART:  Reveals S1, S2.

EXTREMITIES:  Without edema.



LABORATORY DATA:  Hemoglobin is 15.7.  Chemistries:  BUN and creatinine are

39 and 1.3.  Glucose is 141.



IMPRESSION:

1.  _____.

2.  Lung mass consistent with cancer.

3.  Coronary artery disease.

4.  Dyspnea which is now resolved.

5.  Recent non-ST-elevation myocardial infarction

6.  Anemia.



Given these findings, we will continue the patient's treatment of his

non-STEMI medically.  No plans for intervention.







__________________________________________

Shaun Beebe MD



DD:  07/17/2018 13:40:31

DT:  07/17/2018 13:43:32

Job # [id number]

## 2018-08-18 ENCOUNTER — HOSPITAL ENCOUNTER (OUTPATIENT)
Dept: HOSPITAL 42 - ED | Age: 83
Setting detail: OBSERVATION
LOS: 1 days | Discharge: HOME | End: 2018-08-19
Attending: FAMILY MEDICINE | Admitting: FAMILY MEDICINE
Payer: MEDICARE

## 2018-08-18 VITALS — BODY MASS INDEX: 23 KG/M2

## 2018-08-18 DIAGNOSIS — I10: ICD-10-CM

## 2018-08-18 DIAGNOSIS — E78.00: ICD-10-CM

## 2018-08-18 DIAGNOSIS — Z85.118: ICD-10-CM

## 2018-08-18 DIAGNOSIS — K21.9: ICD-10-CM

## 2018-08-18 DIAGNOSIS — F03.90: ICD-10-CM

## 2018-08-18 DIAGNOSIS — I65.23: ICD-10-CM

## 2018-08-18 DIAGNOSIS — E78.5: ICD-10-CM

## 2018-08-18 DIAGNOSIS — Z99.81: ICD-10-CM

## 2018-08-18 DIAGNOSIS — Z86.73: ICD-10-CM

## 2018-08-18 DIAGNOSIS — I27.20: ICD-10-CM

## 2018-08-18 DIAGNOSIS — D64.9: ICD-10-CM

## 2018-08-18 DIAGNOSIS — Z79.01: ICD-10-CM

## 2018-08-18 DIAGNOSIS — Z87.01: ICD-10-CM

## 2018-08-18 DIAGNOSIS — I25.2: ICD-10-CM

## 2018-08-18 DIAGNOSIS — I25.10: ICD-10-CM

## 2018-08-18 DIAGNOSIS — R06.02: ICD-10-CM

## 2018-08-18 DIAGNOSIS — R10.9: ICD-10-CM

## 2018-08-18 DIAGNOSIS — J90: ICD-10-CM

## 2018-08-18 DIAGNOSIS — R07.9: Primary | ICD-10-CM

## 2018-08-18 DIAGNOSIS — Z86.718: ICD-10-CM

## 2018-08-18 DIAGNOSIS — J43.9: ICD-10-CM

## 2018-08-18 DIAGNOSIS — Z87.891: ICD-10-CM

## 2018-08-18 LAB
ALBUMIN SERPL-MCNC: 3.6 G/DL (ref 3–4.8)
ALBUMIN/GLOB SERPL: 1.3 {RATIO} (ref 1.1–1.8)
ALT SERPL-CCNC: 29 U/L (ref 7–56)
APPEARANCE UR: CLEAR
APTT BLD: 34.5 SECONDS (ref 25.1–36.5)
AST SERPL-CCNC: 23 U/L (ref 17–59)
BACTERIA #/AREA URNS HPF: (no result) /[HPF]
BASOPHILS # BLD AUTO: 0.01 K/MM3 (ref 0–2)
BASOPHILS NFR BLD: 0.1 % (ref 0–3)
BILIRUB UR-MCNC: NEGATIVE MG/DL
BUN SERPL-MCNC: 28 MG/DL (ref 7–21)
CALCIUM SERPL-MCNC: 12 MG/DL (ref 8.4–10.5)
COLOR UR: (no result)
EOSINOPHIL # BLD: 0.1 10*3/UL (ref 0–0.7)
EOSINOPHIL NFR BLD: 0.7 % (ref 1.5–5)
ERYTHROCYTE [DISTWIDTH] IN BLOOD BY AUTOMATED COUNT: 16.7 % (ref 11.5–14.5)
GFR NON-AFRICAN AMERICAN: 38
GLUCOSE UR STRIP-MCNC: NEGATIVE MG/DL
GRANULOCYTES # BLD: 6.1 10*3/UL (ref 1.4–6.5)
GRANULOCYTES NFR BLD: 70.7 % (ref 50–68)
HGB BLD-MCNC: 15.8 G/DL (ref 14–18)
INR PPP: 2.61
LEUKOCYTE ESTERASE UR-ACNC: (no result) LEU/UL
LYMPHOCYTES # BLD: 1.4 10*3/UL (ref 1.2–3.4)
LYMPHOCYTES NFR BLD AUTO: 15.6 % (ref 22–35)
MCH RBC QN AUTO: 27.3 PG (ref 25–35)
MCHC RBC AUTO-ENTMCNC: 34.8 G/DL (ref 31–37)
MCV RBC AUTO: 78.5 FL (ref 80–105)
MONOCYTES # BLD AUTO: 1.1 10*3/UL (ref 0.1–0.6)
MONOCYTES NFR BLD: 12.9 % (ref 1–6)
PH UR STRIP: 6 [PH] (ref 4.7–8)
PLATELET # BLD: 196 10^3/UL (ref 120–450)
PMV BLD AUTO: 9.5 FL (ref 7–11)
PROT UR STRIP-MCNC: NEGATIVE MG/DL
PROTHROMBIN TIME: 30.6 SECONDS (ref 9.4–12.5)
RBC # BLD AUTO: 5.78 10^6/UL (ref 3.5–6.1)
RBC # UR STRIP: NEGATIVE /UL
RBC #/AREA URNS HPF: NEGATIVE /HPF (ref 0–2)
SP GR UR STRIP: 1.02 (ref 1–1.03)
TROPONIN I SERPL-MCNC: 0.17 NG/ML
URINE HYALINE CAST: (no result) /HPF
UROBILINOGEN UR STRIP-ACNC: 0.2 E.U./DL
WBC # BLD AUTO: 8.6 10^3/UL (ref 4.5–11)

## 2018-08-18 PROCEDURE — 76705 ECHO EXAM OF ABDOMEN: CPT

## 2018-08-18 PROCEDURE — 74176 CT ABD & PELVIS W/O CONTRAST: CPT

## 2018-08-18 PROCEDURE — 83690 ASSAY OF LIPASE: CPT

## 2018-08-18 PROCEDURE — 36415 COLL VENOUS BLD VENIPUNCTURE: CPT

## 2018-08-18 PROCEDURE — 71045 X-RAY EXAM CHEST 1 VIEW: CPT

## 2018-08-18 PROCEDURE — 85610 PROTHROMBIN TIME: CPT

## 2018-08-18 PROCEDURE — 99285 EMERGENCY DEPT VISIT HI MDM: CPT

## 2018-08-18 PROCEDURE — 87086 URINE CULTURE/COLONY COUNT: CPT

## 2018-08-18 PROCEDURE — 96374 THER/PROPH/DIAG INJ IV PUSH: CPT

## 2018-08-18 PROCEDURE — 80053 COMPREHEN METABOLIC PANEL: CPT

## 2018-08-18 PROCEDURE — 81001 URINALYSIS AUTO W/SCOPE: CPT

## 2018-08-18 PROCEDURE — 85025 COMPLETE CBC W/AUTO DIFF WBC: CPT

## 2018-08-18 PROCEDURE — 84484 ASSAY OF TROPONIN QUANT: CPT

## 2018-08-18 PROCEDURE — 85027 COMPLETE CBC AUTOMATED: CPT

## 2018-08-18 PROCEDURE — 93005 ELECTROCARDIOGRAM TRACING: CPT

## 2018-08-18 PROCEDURE — 94640 AIRWAY INHALATION TREATMENT: CPT

## 2018-08-18 PROCEDURE — 85730 THROMBOPLASTIN TIME PARTIAL: CPT

## 2018-08-18 NOTE — RAD
Date of service: 



08/18/2018



HISTORY:

CP  



COMPARISON:

Comparison chest 07/14/2018. Comparison also made with concurrent CT 

scan abdomen which imaged both lung bases 



FINDINGS:



LUNGS:

Previously noted left lower lobe mass density and suspected loculated 

pleural effusion left lung base poorly seen on this study. Please 

refer to concurrent CT scan of the abdomen pelvis which image both 

lung bases.



PLEURA:

No significant pleural effusion identified, no pneumothorax apparent.



CARDIOVASCULAR:

Small pericardial effusion poorly seen.  Please refer to concurrent 

CT scan of the abdomen pelvis



OSSEOUS STRUCTURES:

No significant abnormalities.



VISUALIZED UPPER ABDOMEN:

Normal.



OTHER FINDINGS:

None.



IMPRESSION:

Previously noted left lower lobe mass density and suspected loculated 

pleural effusion left lung base poorly seen on this study. Please 

refer to concurrent CT scan of the abdomen pelvis which image both 

lung bases. Additionally, small pericardial effusion seen on CT scan 

not appreciated on this study

## 2018-08-18 NOTE — CT
Date of service: 



08/18/2018



PROCEDURE:  CT Abdomen and Pelvis with Oral contrast.



HISTORY:

Right-sided abdominal pain. 



COMPARISON:

Correlation made with prior CT chest dated 07/16/2018 which image the 

upper abdomen.  Correlation also made with PET-CT scan  dated 

02/05/2018.



TECHNIQUE:

Contiguous axial images of the abdomen and pelvis performed of 

following oral contrast material.  Additional 2D sagittal and coronal 

reformats generated. 



This CT exam was performed using one or more of the following dose 

reduction techniques: Automated exposure control, adjustment of the 

mA and/or kV according to patient size, and/or use of iterative 

reconstruction technique.



Total exam DLP = 344.03 mGy-cm. . 



The examination is limited due to the lack of circulating intravenous 

contrast material as well as motion artifact. 



FINDINGS:



LOWER THORAX:

Re- demonstrated is a large mass lesion located in the posteromedial 

lower lung field.  This could be secondary to a large primary 

neoplasm however clinical correlation recommended. Questionable small 

loculated left-sided pleural effusion Extensive centrilobular and 

panlobular emphysematous changes within both lung bases again noted. 

. 



Cardiomegaly.  Small pericardial effusion. 



There is a small pericardial effusion. 



LIVER:

Liver exhibits normal size.  No obvious hepatic mass or collection 

seen on this limited noncontrast study



GALLBLADDER AND BILE DUCTS:

Cholecystectomy. 



PANCREAS:

Pancreas appears atrophic and fatty replaced.



SPLEEN:

Unremarkable. No splenomegaly. 



ADRENALS:

No obvious adrenal lesions. 



KIDNEYS AND URETERS:

Both kidneys are present. Re- demonstrated is a large approximately 

16.6 mm calcification posterior cortex mid pole left kidney. No 

evidence of hydronephrosis 



BLADDER:

Urinary bladder incompletely distended which presumably accounts for 

slight thick-walled appearance.  Muscular hypertrophy may contribute. 

 Correlation with urinalysis recommended to exclude UTI.



REPRODUCTIVE:

Prostate gland measures approximately 4.8 cm in transverse dimension. 



APPENDIX:

Appendix is not seen with any certainty on this study 



BOWEL:

Evaluation of the bowel is somewhat limited due to the incomplete 

opacification and at fairly significant motion artifact on partially 

obscuring of loops of large and small bowel in the upper and mid 

abdomen. 



Stomach is distended with food debris/liquid contrast and air.  

Visualized loops of small bowel exhibit relatively normal contour and 

so far be determined through motion artifact. No evidence of acute 

mechanical small bowel obstruction with oral contrast material 

present in the proximal large bowel. . No gross mural wall thickening 

so far as can be seen within limitation of this study 



PERITONEUM:

Unremarkable. No fluid collection. No free air.



LYMPH NODES:

Unremarkable. No enlarged lymph nodes. 



VASCULATURE:

Minimal localized dilatation distal abdominal aorta measures 

approximately 2.6 cm.  The aorta just proximal to this level measures 

1.98 and just distally measures approximately 1.87 cm 



BONES:

Multilevel degenerative spondylosis of the lower thoracic and lumbar 

spine.  There is very minor levoscoliosis in the lower lumbar region.



OTHER FINDINGS:

None. 



IMPRESSION:

Limited motion degraded study.  Study is further limited by the lack 

of circulating intravenous contrast material. 



Cardiomegaly with small pericardial effusion. 



Re- demonstrated is a large mass lesion left lung base could 

represent on primary neoplasm.  There is associated small loculated 

pleural effusion. 



Cholecystectomy. 



No evidence of acute mechanical bowel obstruction however evaluation 

of the bowel is quite limited particularly the on loops of small 

large bowel in the upper and mid abdomen. 



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 

above. 



Re- demonstrated is a calcification posterior cortex mid pole left 

kidney

## 2018-08-18 NOTE — US
Date of service: 



08/18/2018



HISTORY:

pain, ?gallstones



COMPARISON:

None.



TECHNIQUE:

Sonographic evaluation of the right upper quadrant of the abdomen.



FINDINGS:



LIVER:

Measures 13.3 cm in length. Normal echogenicity of the liver 

parenchyma.  No mass. No intrahepatic bile duct dilatation. 



GALLBLADDER:

Cholecystectomy 



COMMON BILE DUCT:

Measures 8.0 mm likely due to cholecystectomy and advanced age.  No 

stones. No dilatation.



PANCREAS:

Unremarkable as visualized. No mass. No ductal dilatation.



RIGHT KIDNEY:

Measures 9.0 x 4.1 x 5.3 cm in length. Normal echogenicity. No 

calculus, mass, or hydronephrosis.



AORTA:

No aneurysmal dilatation.



IVC:

Unremarkable.



OTHER FINDINGS:

None .



IMPRESSION:

Cholecystectomy.  Common bile duct slightly the prominent 8 mm though 

this is likely due to post cholecystectomy and advanced age.

## 2018-08-18 NOTE — ED PDOC
Arrival/HPI





<Aj Mohan - Last Filed: 08/18/18 12:52>





- General


Historian: Patient





- History of Present Illness


Time/Duration: < week


Symptom Onset: Gradual


Symptom Course: Unchanged, Intermittent, Worsening


Quality: Aching, Cramping, Gas Like


Severity Level: 6


Activities at Onset: Rest


Context: Exertion





<Dank Holden - Last Filed: 08/18/18 17:28>





- General


Chief Complaint: Abdominal Pain


Time Seen by Provider: 08/18/18 10:36





- History of Present Illness


Narrative History of Present Illness (Text): 





08/18/18 11:07


Patient is an 86 year old male with PMH of recent NSTEMI, COPD (on home O2), 

lung CA, and DVT (9 years ago) who presents to Emergency department with what 

he says is chest pain and SOB for the last two days. When asked to point to the 

location of his pain, he points to the mid-epigastric region. Patient states 

that the epigastric pain is an intermittent, gas-like cramping pain that 

radiates to his lower abdomen and groin. His family is also concerned that he 

has been urinating less. He normally follows with his PMD Dr. Reed and Dr. Beebe his cardiologist. He admits to non-productive cough, dyspnea on exertion, 

but denies wheezing, palpitations, fever, chills, nausea/vomiting/diarrhea, or 

dysuria.  (Dank Holden)





Past Medical History





- Provider Review


Nursing Documentation Reviewed: Yes





- Travel History


Have you recently traveled outside [place] w/in the past 3 mons?: No





- Infectious Disease


Hx of Infectious Diseases: None





- Cardiac


Hx Cardiac Disorders: Yes


Hx MI: Yes


Other/Comment: DVT rt leg





- Pulmonary


Hx Respiratory Disorders: Yes


Hx Chronic Obstructive Pulmonary Disease (COPD): Yes


Hx Emphysema: Yes


Hx Pneumonia: Yes





- Neurological


Hx Neurological Disorder: Yes


Hx Transient Ischemic Attacks (TIA): Yes





- HEENT


Hx HEENT Disorder: Yes (eyeglasses)





- Renal


Hx Renal Disorder: Yes


Hx Kidney Stones: Yes


Hx Renal Failure: Yes





- Endocrine/Metabolic


Hx Endocrine Disorders: No





- Hematological/Oncological


Hx Blood Disorders: Yes


Hx Cancer: Yes





- Integumentary


Hx Dermatological Disorder: No





- Musculoskeletal/Rheumatological


Hx Musculoskeletal Disorders: Yes


Hx Back Pain: Yes





- Gastrointestinal


Hx Gastrointestinal Disorders: Yes


Hx Gastroesophageal Reflux: Yes





- Genitourinary/Gynecological


Hx Genitourinary Disorders: No





- Psychiatric


Hx Psychophysiologic Disorder: No


Hx Substance Use: No





- Surgical History


Hx Cardiac Catheterization: Yes


Hx Cholecystectomy: Yes





- Anesthesia


Hx Anesthesia: No





<Dank Holden - Last Filed: 08/18/18 17:28>





Family/Social History





- Physician Review


Nursing Documentation Reviewed: Yes


Family/Social History: CAD/MI (father)


Smoking Status: Former Smoker (former smoker with 40 pack year history, quit 

more than 10 years ago)


Hx Alcohol Use: No


Hx Substance Use: No





<Dank Holden - Last Filed: 08/18/18 17:28>





Allergies/Home Meds





<Aj Mohan - Last Filed: 08/18/18 12:52>





<Dank Holden - Last Filed: 08/18/18 17:28>


Allergies/Adverse Reactions: 


Allergies





No Known Allergies Allergy (Verified 08/18/18 10:42)


 








Home Medications: 


 Home Meds











 Medication  Instructions  Recorded  Confirmed


 


Warfarin [Coumadin] 3 mg PO DAILY 05/19/16 08/18/18


 


Albuterol HFA [Ventolin HFA 90 0.09 mg IH BID 07/14/18 08/18/18





mcg/actuation (8 g)]   


 


Albuterol/Ipratropium [Duoneb 3 3 ml IH BID 07/14/18 08/18/18





mg/0.5 mg (3 ml) UD]   


 


Cholecalciferol [Vitamin D 1000 IU] 2,000 iu PO DAILY 07/14/18 08/18/18


 


Furosemide [Lasix] 20 mg PO DAILY 07/14/18 08/18/18


 


Metoprolol Succinate [Toprol Xl] 25 mg PO DAILY 07/14/18 08/18/18


 


traMADol [Ultram] 50 mg PO PRN PRN 07/14/18 08/18/18














Review of Systems





- Physician Review


All systems were reviewed & negative as marked: Yes





- Review of Systems


Constitutional: Fatigue.  absent: Weight Change, Fevers, Night Sweats


Eyes: absent: Vision Changes


ENT: absent: Sore Throat, Rhinorrhea


Respiratory: SOB, Cough.  absent: Sputum, Wheezing


Cardiovascular: MCCALL.  absent: Chest Pain, Palpitations, Orthopnea, Syncope


Gastrointestinal: Abdominal Pain.  absent: Stool Changes, Diarrhea, Nausea, 

Vomiting


Genitourinary Male: Urinary Output Changes.  absent: Dysuria, Frequency


Musculoskeletal: absent: Arthralgias


Skin: absent: Rash


Neurological: absent: Headache, Dizziness, Speech Changes


Endocrine: absent: Diaphoresis


Hemo/Lymphatic: absent: Adenopathy


Psychiatric: absent: Anxiety, Depression





<HoldenDank العلي - Last Filed: 08/18/18 17:28>





Physical Exam


Vital Signs Reviewed: Yes


Temperature: Afebrile


Blood Pressure: Normal


Pulse: Regular


Respiratory Rate: Normal


Appearance: Positive for: Ill-Appearing, Uncomfortable


Pain Distress: Moderate


Mental Status: Positive for: Alert and Oriented X 3





- Systems Exam


Head: Present: Atraumatic, Normocephalic


Pupils: Present: PERRL


Extroacular Muscles: Present: EOMI


Conjunctiva: Present: Normal


Ears: Present: Normal


Mouth: Present: Dry


Pharnyx: Present: Normal.  No: ERYTHEMA, EXUDATE


Nose (External): Present: Atraumatic


Neck: Present: Normal Range of Motion.  No: JVD


Respiratory/Chest: Present: Clear to Auscultation.  No: Wheezes, Rales, Rhonchi


Cardiovascular: Present: Regular Rate and Rhythm, Normal S1, S2.  No: Murmurs, 

Rub, Gallop


Abdomen: Present: Tenderness (mild tenderness to palpation mid-epigastric 

region and RLQ).  No: Rebound, Guarding


Back: Present: Normal Inspection


Upper Extremity: Present: Normal Inspection, NORMAL PULSES.  No: Cyanosis, Edema


Lower Extremity: Present: Normal Inspection, NORMAL PULSES.  No: Edema, Swelling


Neurological: Present: Speech Normal


Skin: Present: Warm, Dry


Psychiatric: Present: Alert, Oriented x 3





<HoldenDank العلي - Last Filed: 08/18/18 17:28>


Vital Signs











  Temp Pulse Resp BP Pulse Ox


 


 08/18/18 15:19   82  19   90 L


 


 08/18/18 14:10   81  19  116/43 L  90 L


 


 08/18/18 12:11     102/64 


 


 08/18/18 11:50   77   102/64  88 L


 


 08/18/18 11:10   81  19  117/88  94 L


 


 08/18/18 10:53  98 F  82  21  99/58 L  93 L


 


 08/18/18 10:50   87  22  99/58 L  92 L














Medical Decision Making





<Aj Mohan - Last Filed: 08/18/18 12:52>





- Lab Interpretations


I have reviewed the lab results: Yes


Interpretation: No sign. chg./baseline (troponin 0.17 not significantly changed 

from prior visit, BUN/Cr not changed from baseline)





- RAD Interpretation


: ED Physician, Radiologist





- EKG Interpretation


Interpreted by ED Physician: Yes


Type: 12 lead EKG


Comparison: Com.w/previous EKG





<Dank Holden - Last Filed: 08/18/18 17:28>


ED Course and Treatment: 








08/18/18 12:51


86 year old male presenting to the Emergency department complaining of chest 

pain. 


Patient Seen With Resident:


In agreement with resident note which contains more details about the patient. 

Patient was seen and evaluated with resident. Came up with plan and treatment 

together. 


 (Aj Mohan)





08/18/18 11:13


-Patient initially stated he had chest pain and SOB but when asked to point to 

location of pain he points to the mid-epigastric region


-Will get CBC, CMP, Troponin, Chest X-Ray, Lipase, CT abdomen/pelvis with PO 

contrast and gallbladder/pancreas US


-Patient took four 81 mg ASA at home before coming to Emergency department


08/18/18 13:09


-Low suspicion for acute ischemia 


-CT with PO contrast pending


08/18/18 16:52


-CT with PO contrast was limited but showed no acute changes from prior


-Troponin of 0.17 is slightly elevated from prior visits


-Results discussed with family who states they would like him to stay overnight


-Case discussed with Dr. Reed who agrees to admit for observation


-Per Dr. Reed request, will repeat troponin at 1900 (8 hours from initial) 

and Q8h x 2 (Dank Holden)





- Lab Interpretations


Lab Results: 








 08/18/18 11:00 





 08/18/18 11:00 





 Lab Results





08/18/18 17:10: Urine Color Dark yellow, Urine Appearance Clear, Urine pH 6.0, 

Ur Specific Gravity 1.025, Urine Protein Negative, Urine Glucose (UA) Negative, 

Urine Ketones Negative, Urine Blood Negative, Urine Nitrate Negative, Urine 

Bilirubin Negative, Urine Urobilinogen 0.2, Ur Leukocyte Esterase Trace H, 

Urine RBC Pending, Urine WBC Pending


08/18/18 11:00: Lipase 70


08/18/18 11:00: Sodium 140, Potassium 4.3, Chloride 108 H, Carbon Dioxide 21, 

Anion Gap 16, BUN 28 H, Creatinine 1.7 H, Est GFR ( Amer) 46, Est GFR (

Non-Af Amer) 38, Random Glucose 103, Calcium 12.0 H, Total Bilirubin 0.8, AST 23

, ALT 29, Alkaline Phosphatase 64, Troponin I 0.17 H* D, Total Protein 6.4, 

Albumin 3.6, Globulin 2.8, Albumin/Globulin Ratio 1.3


08/18/18 11:00: PT 30.6 H, INR 2.61, APTT 34.5


08/18/18 11:00: WBC 8.6  D, RBC 5.78, Hgb 15.8, Hct 45.4, MCV 78.5 L, MCH 27.3, 

MCHC 34.8, RDW 16.7 H, Plt Count 196, MPV 9.5, Gran % 70.7 H, Lymph % (Auto) 

15.6 L, Mono % (Auto) 12.9 H, Eos % (Auto) 0.7 L, Baso % (Auto) 0.1, Gran # 6.10

, Lymph # (Auto) 1.4, Mono # (Auto) 1.1 H, Eos # (Auto) 0.1, Baso # (Auto) 0.01











- RAD Interpretation


Narrative RAD Interpretations (Text): 


CXR without acute findings


08/18/18 15:06


TECHNIQUE:


Sonographic evaluation of the right upper quadrant of the abdomen.


FINDINGS:


LIVER:Measures 13.3 cm in length. Normal echogenicity of the liver parenchyma.  

No mass. No intrahepatic bile duct dilatation. 


GALLBLADDER:Cholecystectomy 


COMMON BILE DUCT:Measures 8.0 mm likely due to cholecystectomy and advanced 

age.  No stones. No dilatation.


PANCREAS:Unremarkable as visualized. No mass. No ductal dilatation.


RIGHT KIDNEY:Measures 9.0 x 4.1 x 5.3 cm in length. Normal echogenicity. No 

calculus, mass, or hydronephrosis.


AORTA:No aneurysmal dilatation.


IVC:Unremarkable.


OTHER FINDINGS:None .


IMPRESSION: Cholecystectomy.  Common bile duct slightly the prominent 8 mm 

though this is likely due to post cholecystectomy and advanced age.


08/18/18 16:30


PROCEDURE:  CT Abdomen and Pelvis with Oral contrast.


HISTORY:Right-sided abdominal pain. 


COMPARISON:Correlation made with prior CT chest dated 07/16/2018 which image 

the upper abdomen.  Correlation also made with PET-CT scan  dated 02/05/2018.


TECHNIQUE:Contiguous axial images of the abdomen and pelvis performed of 

following oral contrast material.  Additional 2D sagittal and coronal reformats 

generated. 


This CT exam was performed using one or more of the following dose reduction 

techniques: Automated exposure control, adjustment of the mA and/or kV 

according to patient size, and/or use of iterative reconstruction technique.


Total exam DLP = 344.03 mGy-cm. . 


The examination is limited due to the lack of circulating intravenous contrast 

material as well as motion artifact. 


FINDINGS:


LOWER THORAX:Re- demonstrated is a large mass lesion located in the 

posteromedial lower lung field.  This could be secondary to a large primary 

neoplasm however clinical correlation recommended. Questionable small loculated 

left-sided pleural effusion Extensive centrilobular and panlobular 

emphysematous changes within both lung bases again noted. . 


Cardiomegaly.  Small pericardial effusion. 


There is a small pericardial effusion. 


LIVER:Liver exhibits normal size.  No obvious hepatic mass or collection seen 

on this limited noncontrast study


GALLBLADDER AND BILE DUCTS:Cholecystectomy. 


PANCREAS:Pancreas appears atrophic and fatty replaced.


SPLEEN:Unremarkable. No splenomegaly. 


ADRENALS:No obvious adrenal lesions. 


KIDNEYS AND URETERS:Both kidneys are present. Re- demonstrated is a large 

approximately 16.6 mm calcification posterior cortex mid pole left kidney. No 

evidence of hydronephrosis 


BLADDER:Urinary bladder incompletely distended which presumably accounts for 

slight thick-walled appearance.  Muscular hypertrophy may contribute.  

Correlation with urinalysis recommended to exclude UTI.


REPRODUCTIVE:Prostate gland measures approximately 4.8 cm in transverse 

dimension. 


APPENDIX:Appendix is not seen with any certainty on this study 


BOWEL:Evaluation of the bowel is somewhat limited due to the incomplete 

opacification and at fairly significant motion artifact on partially obscuring 

of loops of large and small bowel in the upper and mid abdomen. 


Stomach is distended with food debris/liquid contrast and air.  Visualized 

loops of small bowel exhibit relatively normal contour and so far be determined 

through motion artifact. No evidence of acute mechanical small bowel 

obstruction with oral contrast material present in the proximal large bowel. . 

No gross mural wall thickening so far as can be seen within limitation of this 

study 


PERITONEUM:Unremarkable. No fluid collection. No free air.


LYMPH NODES:Unremarkable. No enlarged lymph nodes. 


VASCULATURE:Minimal localized dilatation distal abdominal aorta measures 

approximately 2.6 cm.  The aorta just proximal to this level measures 1.98 and 

just distally measures approximately 1.87 cm 


BONES:Multilevel degenerative spondylosis of the lower thoracic and lumbar 

spine.  There is very minor levoscoliosis in the lower lumbar region.


OTHER FINDINGS:


None. 


IMPRESSION:Limited motion degraded study.  Study is further limited by the lack 

of circulating intravenous contrast material. 


Cardiomegaly with small pericardial effusion. 


Re- demonstrated is a large mass lesion left lung base could represent on 

primary neoplasm.  There is associated small loculated pleural effusion. 


Cholecystectomy. 


No evidence of acute mechanical bowel obstruction however evaluation of the 

bowel is quite limited particularly the on loops of small large bowel in the 

upper and mid abdomen. 


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 


Re- demonstrated is a calcification posterior cortex mid pole left kidney (

Dank Holden)


Radiology Orders: 








08/18/18 11:05


CHEST PORTABLE [RAD] Stat 





08/18/18 11:38


HEPATIC [US] Stat 





08/18/18 11:40


ABDOMEN & PELVIS [ABD & PELVIS PO CONTRAST ONLY] [CT] Stat 














- EKG Interpretation


EKG Interpretation (Text): 





08/18/18 12:24


Sinus rhythm with 1st degree AV block, RBBB unchanged from prior EKGs (Dank Holden)





- Medication Orders


Current Medication Orders: 











Discontinued Medications





Morphine Sulfate (Morphine)  2 mg IVP STAT STA


   Stop: 08/18/18 11:42


   Last Admin: 08/18/18 11:52  Dose: 2 mg





MAR Pain Assessment


 Document     08/18/18 11:52  GMI  (Rec: 08/18/18 11:52  GMI  [id number])


     Pain Reassessment


      Is this a pain reassessment?               Yes


     Sleep


      Is patient sleeping during reassessment?   No


     Presence of Pain


      Presence of Pain                           Yes


     Pain Scale Used


      Pain Scale Used                            Numeric


     Location


      Left, Right or Bilateral                   Left


      Pain Location Body Site                    Chest


     Description


      Description                                Constant


      Intensity of Pain at present               6


      Pain Behavior                              Facial Grimacing


      Alleviating Factors/Management             Position Change


       Techniques                                


      Alleviating Factors                        Medication


IVP Administration


 Document     08/18/18 11:52  GMI  (Rec: 08/18/18 11:52  GMI  [id number])


     Charges for Administration


      # of IVP Administrations                   1


Re-Assess: MAR Pain Assessment


 Document     08/18/18 12:52  GMI  (Rec: 08/18/18 17:08  GMI  [id number])


     Pain Reassessment


      Is this a pain reassessment?               Yes


     Sleep


      Is patient sleeping during reassessment?   No














- PA / NP / Resident Statement


MD/DO has reviewed & agrees with the documentation as recorded.


MD/DO has examined the patient and agrees with the treatment plan.





- Scribe Statement


The provider has reviewed the documentation as recorded by the Scribe





<Aj Mohan - Last Filed: 08/18/18 12:52>





<Dank Holden - Last Filed: 08/18/18 17:28>





- Scribe Statement





Elly Muhammad





All medical record entries made by the Scribe were at my direction and 

personally dictated by me. I have reviewed the chart and agree that the record 

accurately reflects my personal performance of the history, physical exam, 

medical decision making, and the department course for this patient. I have 

also personally directed, reviewed, and agree with the discharge instructions 

and disposition.


 (Aj Mohan)





Disposition/Present on Arrival





<Aj Mohan - Last Filed: 08/18/18 12:52>





- Present on Arrival


Any Indicators Present on Arrival: Yes


History of DVT/PE: Yes


History of Uncontrolled Diabetes: No


Urinary Catheter: No


History of Decub. Ulcer: No


History Surgical Site Infection Following: None





- Disposition


Have Diagnosis and Disposition been Completed?: Yes


Disposition Time: 16:55


Patient Plan: Admission, Observation





<Dank Holden - Last Filed: 08/18/18 17:28>





- Disposition


Diagnosis: 


 Abdominal pain, Elevated troponin





Disposition: HOSPITALIZED


Patient Problems: 


 Current Active Problems











Problem Status Onset


 


Abdominal pain Acute  


 


Elevated troponin Acute  











Condition: FAIR

## 2018-08-19 VITALS — HEART RATE: 93 BPM

## 2018-08-19 VITALS
TEMPERATURE: 97.9 F | OXYGEN SATURATION: 92 % | RESPIRATION RATE: 19 BRPM | DIASTOLIC BLOOD PRESSURE: 69 MMHG | SYSTOLIC BLOOD PRESSURE: 112 MMHG

## 2018-08-19 LAB
ALBUMIN SERPL-MCNC: 3.1 G/DL (ref 3–4.8)
ALBUMIN/GLOB SERPL: 1.3 {RATIO} (ref 1.1–1.8)
ALT SERPL-CCNC: 29 U/L (ref 7–56)
AST SERPL-CCNC: 16 U/L (ref 17–59)
BUN SERPL-MCNC: 26 MG/DL (ref 7–21)
CALCIUM SERPL-MCNC: 11.8 MG/DL (ref 8.4–10.5)
ERYTHROCYTE [DISTWIDTH] IN BLOOD BY AUTOMATED COUNT: 16.7 % (ref 11.5–14.5)
GFR NON-AFRICAN AMERICAN: 48
HGB BLD-MCNC: 15 G/DL (ref 14–18)
INR PPP: 2.36
MCH RBC QN AUTO: 26.6 PG (ref 25–35)
MCHC RBC AUTO-ENTMCNC: 34 G/DL (ref 31–37)
MCV RBC AUTO: 78.2 FL (ref 80–105)
PLATELET # BLD: 179 10^3/UL (ref 120–450)
PMV BLD AUTO: 10 FL (ref 7–11)
PROTHROMBIN TIME: 27.6 SECONDS (ref 9.4–12.5)
RBC # BLD AUTO: 5.64 10^6/UL (ref 3.5–6.1)
TROPONIN I SERPL-MCNC: 0.11 NG/ML
WBC # BLD AUTO: 7 10^3/UL (ref 4.5–11)

## 2018-08-19 RX ADMIN — IPRATROPIUM BROMIDE AND ALBUTEROL SULFATE SCH ML: .5; 3 SOLUTION RESPIRATORY (INHALATION) at 07:34

## 2018-08-19 RX ADMIN — IPRATROPIUM BROMIDE AND ALBUTEROL SULFATE SCH ML: .5; 3 SOLUTION RESPIRATORY (INHALATION) at 01:13

## 2018-08-19 NOTE — CARD
--------------- APPROVED REPORT --------------





Date of service: 08/18/2018



EKG Measurement

Heart [id number]

[id number]

[id number]

[id number]

[id number]



<Conclusion>

Sinus rhythm with 1st degree AV block

Right bundle branch block

Abnormal ECG

## 2018-08-19 NOTE — HP
Copied To:  Tae Reed DO

Attending MD:  Tae Reed DO



HISTORY OF PRESENT ILLNESS:  I know Herson very well from house calls and I

saw him in the emergency room at [facility].  He is a very nice

86-year-old  man who presents with abdominal pain and chest

pain for the last 2 days.



PAST MEDICAL HISTORY:  He has had a past medical history of recent NSTEMI,

COPD on home O2.  He has had lung cancer, DVT 9 years ago.  He has dementia

with pain to the midepigastric region and the left chest, gas like cramping

radiate to his lower abdomen and groin.  He has also been urinating less,

occasional cough, emphysema, pneumonia history, TIAs, kidney stones, renal

failure in the past, lung cancer, back pain chronic, gastroesophageal

reflux disease, cholecystectomy, cardiac catheterizations in the past.



SOCIAL HISTORY:  Former smoker, 40-pack year history, quit more than 10

years ago.  No alcohol.  No substance abuse.



ALLERGIES:  NO KNOWN DRUG ALLERGIES.



MEDICATIONS:  He takes Coumadin, albuterol, vitamin D, Lasix, Toprol,

Ultram.



REVIEW OF SYSTEMS:  He is tired at his baseline.  No acute weight changes,

fever or night sweats.  No acute vision changes.  No sore throat.  No

rhinorrhea.  No hearing changes.  There is shortness of breath.  There is a

cough.  No sputum or wheezing.  There is dyspnea on exertion.  No chest

pain or palpitations, but he did tell me he had left-sided chest pain,

abdominal pain.  No stool changes with diarrhea, nausea or vomiting.  No

problems urinating.  No arthralgias.  He did get back pain from time to

time.  No apparent rashes or ulcers.  No headache, dizziness, or speech

changes.  No sweating.  No swollen glands that he knows of.  Not anxious. 

Not depressed.



PHYSICAL EXAMINATION:

VITAL SIGNS:  He has 98 temperature, 87 pulse, 22 respiratory rate, 99/58

blood pressure, 92% O2 sat.

GENERAL:  He is comfortable lying on the gurney.  His chest pain at this

point has gone.  He was not feeling well earlier.  He was in moderate

distress.  He is alert and oriented x3.

HEENT:  Head is atraumatic, normocephalic.  Pupils equal and reactive to

light.  Extraocular muscles intact.  Throat is moist.

NECK:  Supple.

HEART:  Regular rate.  Normal S1, S2.

LUNGS:  Decreased breath sounds but clear to auscultation.  No wheezes, no

rhonchi, no rales.

ABDOMEN:  Soft, nontender.  Positive bowel sounds.  No guarding, no

rebound, no CVA tenderness.  He had epigastric tenderness earlier and now

he did not.

EXTREMITIES:  Have no edema.

NEUROLOGIC:  Speech is normal.

SKIN:  Warm and dry.

PSYCHIATRIC:  Alert and oriented x3 at this time, but sometimes he could be

confused.

LYMPHATICS:  Thyroid midline.  No palpable appreciable lymphadenopathy.



LABORATORY DATA:  He had multiple tests performed.  He had a CT scan of the

abdomen and pelvis, did not show small bowel obstruction.  He has small

abdominal aortic aneurysm, 1-2 mm.  Gallbladder has been removed.  Chest

x-ray  is showing the left lower lobe mass, which is a cancer.  He has a

urine that is clean.  White count 8.6, hemoglobin 15.8, hematocrit 45.4,

platelets 196.  INR is 2.61.  He has a 140 sodium, potassium 4.3, BUN 28,

creatinine 1.7, GFR is 38, sugar is 103, calcium is 12 high, total bili is

0.8, AST is 23, ALT is 29, alk phos 64.  Troponin I 0.17, 0.17, waiting for

the third one.  Total protein is 6.4, lipase is 70.



ASSESSMENT AND PLAN:  He is definitely having a non-ST elevation myocardial

infarction.  Cardiology is consulted.  We put on his regular medication. 

The abdominal pain has subsided.  He will have a consult with Cardiology. 

He will be put back on his Coumadin, albuterol, Lasix, Exelon patch,

metoprolol, tramadol, albuterol, vitamin D and we will keep a very close

eye on that.  He is also on oxygen and he is very comfortable.  He is also

in the hospital observation level of care.





__________________________________________

Tae Reed DO





DD:  08/18/2018 23:00:53

DT:  08/18/2018 23:04:17

Job # [id number]

## 2018-08-20 NOTE — DS
Copied To:  Tae Reed DO

Attending MD: Tae Reed DO



HISTORY OF PRESENT ILLNESS:  Herson Molina will probably be discharged this

afternoon.  He is comfortable in bed.  No chest pain, shortness of breath

or abdominal pain.  He ate very well.  He is on albuterol, Coumadin,

DuoNeb, Exelon, Lasix, Toprol, Ultram and vitamin D.  We will hold the

Lasix and metoprolol if his blood pressure is below 100.



PHYSICAL EXAMINATION:

VITAL SIGNS:  He has a 97.9 temperature, 86 pulse, 112/69 blood pressure,

19 respiratory rate, 92% O2 sat on 2 liters.

HEAD:  Atraumatic, normocephalic.

GENERAL:  He is alert and oriented x3 right now, he can get confused at

times, but he is pleasant.  He wants to go home.  No chest pain or

shortness of breath.  No abdominal pain.

HEART:  Regular rate.

LUNGS:  Clear to auscultation with decreased breath sounds.

ABDOMEN:  Soft, nontender.  Positive bowel sounds.

EXTREMITIES:  Have no edema.



ASSESSMENT AND PLAN:  I discussed this with the cardiologist this morning. 

I do not think they have any plans for a cardiac catheterization.  He does

have a persistent left lower lung mass, which is a cancer and has not

changed in size in about a year, which is good.



LABORATORY DATA:  He has a 7 white count, 15 hemoglobin, 44.1 hematocrit

with 179 platelets.  His INR is 2.36.  He is on Coumadin.  He has a 138

sodium, potassium 4.4.  BUN is 26; creatinine 1.4, better.  GFR is 48. 

Sugar is 99.  Calcium 11.8, total bili is 1, AST is 16, ALT is 29, alk phos

62.  His troponin came down to 0.11.  His total protein is 5.5.  Everything

is trending well.  I think this is a  of opportunity.  I will

discharge him home.  See him on house calls.  Continue with the same

medications he was taking at home.  I discussed with the cardiologist and

the nurse and the family.





__________________________________________

Tae Reed DO





DD:  08/19/2018 9:35:31

DT:  08/19/2018 9:37:35

Job # [id number]

## 2018-08-20 NOTE — CON
Copied To:  Michelle Rodríguez MD

Attending MD:  Michelle Rodríguez MD



DATE:  08/19/2018



Covering Dr. Shaun Beebe.



REASON FOR CONSULTATION:  Nonischemic cardiac evaluation.



BRIEF CLINICAL HISTORY:  This is an 86-year-old male with past medical

history significant for coronary artery disease, recent non-STEMI, decided

to be treated medically, COPD, on home oxygen, DVT 9 years ago, had a lung

cancer, dementia, came in with complaint of abdominal pain and chest pain

for last 2 days.  Other complaint include shortness of breath.  Now patient

is chest pain free.



PAST MEDICAL HISTORY:  Significant for nonischemic coronary artery disease,

lung mass, hypercholesterolemia, hyperlipidemia.    Recent history of

non-STEMI, admitted on 07/14/2018, hypertension, lung mass.



SOCIAL HISTORY:  Former smoker.



RECENT CARDIAC WORKUP:  As follows, patient had echocardiography on

07/16/2018 that revealed ejection fraction of _____  right ventricle

severely dilated, RV is moderate-to-severely reduced, trace-to-mild mitral

regurgitation, moderate-to-severe tricuspid regurgitation, right

ventricular systolic pressure of 81 consistent with severe pulmonary

hypertension.  Bilateral carotid duplex on 07/15/2018 shows bilateral 29%

ICA stenosis.  The patient had on last admission, lung scan nuclear done on

07/14/2014, consistent with low probability for PE.



CURRENT MEDICATIONS:  The patient is taking at home tramadol, Coumadin 3 mg

for DVT/PE, metoprolol, Lasix, cholecalciferol, albuterol.



REVIEW OF SYSTEMS:  As per HPI.



PHYSICAL EXAMINATION:  As follows:

VITAL SIGNS:  Temperature afebrile, heart rate 62, blood pressure 102/69.

HEENT:  PERRLA.  Extraocular muscles intact.

NECK:  Supple.  No carotid bruits or thyromegaly.

CHEST:  Clear to auscultation.

HEART:  S1 and S2 regular.

ABDOMEN:  Soft.

EXTREMITIES:  Clubbing and cyanosis negative.



LABORATORY DATA:  Blood workup as follows, WBC 7, hemoglobin 15, hematocrit

44.1, platelet count 179.  Chemistry shows sodium 130, potassium 4.4,

chloride _____, carbon dioxide 19, anion gap of 14, BUN 26, creatinine 1.4.

Initially troponin was 0.17, now troponin is 0.11 .



IMPRESSION:  An 86-year-old male with past medical history significant for

lung cancer, history of non-ST-elevation myocardial infarction, coronary

artery disease, recently admitted in July with non-ST-elevation myocardial

infarction, decided to be treated medically, admitted yesterday with

abdominal pain, borderline troponin, positive lung mass, history of deep

venous thrombosis, on Coumadin, history of anemia.  Since patient is chest

pain free, troponin trend down, decided to treat medically.  INR

therapeutic 2.236.  Last echo showed preserved LV function, but severe

pulmonary hypertension.



RECOMMENDATIONS:  Continue Coumadin, continue tramadol, consider baby

aspirin.  Discussed with Dr. Reed in length.  Since the patient is chest

pain free, decided to be treated medically.  We will follow with you.  If

the patient remains well, we will transfer care tomorrow to Dr. Shaun Beebe.

The patient is asymptomatic, has been treated medically.





__________________________________________

Michelle Rodríguez MD



DD:  08/19/2018 11:14:27

DT:  08/19/2018 19:46:59

Job # [id number]

## 2018-09-28 ENCOUNTER — HOSPITAL ENCOUNTER (OUTPATIENT)
Dept: HOSPITAL 42 - ED | Age: 83
Setting detail: OBSERVATION
LOS: 2 days | Discharge: HOME | End: 2018-09-30
Attending: FAMILY MEDICINE | Admitting: FAMILY MEDICINE
Payer: MEDICARE

## 2018-09-28 VITALS — BODY MASS INDEX: 21.7 KG/M2

## 2018-09-28 DIAGNOSIS — F17.210: ICD-10-CM

## 2018-09-28 DIAGNOSIS — Z99.81: ICD-10-CM

## 2018-09-28 DIAGNOSIS — I82.431: Primary | ICD-10-CM

## 2018-09-28 DIAGNOSIS — J44.9: ICD-10-CM

## 2018-09-28 DIAGNOSIS — N28.9: ICD-10-CM

## 2018-09-28 DIAGNOSIS — I25.10: ICD-10-CM

## 2018-09-28 DIAGNOSIS — Z66: ICD-10-CM

## 2018-09-28 DIAGNOSIS — I50.810: ICD-10-CM

## 2018-09-28 DIAGNOSIS — Z87.01: ICD-10-CM

## 2018-09-28 DIAGNOSIS — C34.12: ICD-10-CM

## 2018-09-28 DIAGNOSIS — Z79.02: ICD-10-CM

## 2018-09-28 DIAGNOSIS — I25.2: ICD-10-CM

## 2018-09-28 DIAGNOSIS — R09.02: ICD-10-CM

## 2018-09-28 DIAGNOSIS — K21.9: ICD-10-CM

## 2018-09-28 DIAGNOSIS — I27.29: ICD-10-CM

## 2018-09-28 DIAGNOSIS — C34.32: ICD-10-CM

## 2018-09-28 LAB
ALBUMIN SERPL-MCNC: 3.6 G/DL (ref 3–4.8)
ALBUMIN/GLOB SERPL: 1.3 {RATIO} (ref 1.1–1.8)
ALT SERPL-CCNC: 19 U/L (ref 7–56)
APTT BLD: 34 SECONDS (ref 25.1–36.5)
ARTERIAL BLOOD GAS HEMOGLOBIN: 14.7 G/DL (ref 11.7–17.4)
ARTERIAL BLOOD GAS O2 SAT: 94.5 % (ref 95–98)
ARTERIAL BLOOD GAS PCO2: 26 MM/HG (ref 35–45)
ARTERIAL BLOOD GAS TCO2: 18.9 MMOL.L (ref 22–28)
AST SERPL-CCNC: 22 U/L (ref 17–59)
BASOPHILS # BLD AUTO: 0.02 K/MM3 (ref 0–2)
BASOPHILS NFR BLD: 0.3 % (ref 0–3)
BUN SERPL-MCNC: 31 MG/DL (ref 7–21)
CALCIUM SERPL-MCNC: 12 MG/DL (ref 8.4–10.5)
D DIMER PPP FEU-MCNC: 767 NG/MLDDU (ref 0–243)
EOSINOPHIL # BLD: 0.1 10*3/UL (ref 0–0.7)
EOSINOPHIL NFR BLD: 1 % (ref 1.5–5)
ERYTHROCYTE [DISTWIDTH] IN BLOOD BY AUTOMATED COUNT: 17.9 % (ref 11.5–14.5)
GFR NON-AFRICAN AMERICAN: 44
GRANULOCYTES # BLD: 5.2 10*3/UL (ref 1.4–6.5)
GRANULOCYTES NFR BLD: 67.4 % (ref 50–68)
HCO3 BLDA-SCNC: 18.1 MMOL/L (ref 21–28)
HGB BLD-MCNC: 14.8 G/DL (ref 14–18)
INHALED O2 CONCENTRATION: 35 %
INR PPP: 2.62
LYMPHOCYTES # BLD: 1.4 10*3/UL (ref 1.2–3.4)
LYMPHOCYTES NFR BLD AUTO: 17.7 % (ref 22–35)
MCH RBC QN AUTO: 27.4 PG (ref 25–35)
MCHC RBC AUTO-ENTMCNC: 33.6 G/DL (ref 31–37)
MCV RBC AUTO: 81.5 FL (ref 80–105)
MONOCYTES # BLD AUTO: 1.1 10*3/UL (ref 0.1–0.6)
MONOCYTES NFR BLD: 13.6 % (ref 1–6)
O2 CAP BLDA-SCNC: 20.1 ML/DL (ref 16–24)
O2 CT BLDA-SCNC: 19 ML/DL (ref 15–23)
PH BLDA: 7.45 [PH] (ref 7.35–7.45)
PLATELET # BLD: 155 10^3/UL (ref 120–450)
PMV BLD AUTO: 10.6 FL (ref 7–11)
PO2 BLDA: 62 MM/HG (ref 80–100)
PROTHROMBIN TIME: 30.5 SECONDS (ref 9.4–12.5)
RBC # BLD AUTO: 5.4 10^6/UL (ref 3.5–6.1)
TROPONIN I SERPL-MCNC: 0.14 NG/ML
WBC # BLD AUTO: 7.7 10^3/UL (ref 4.5–11)

## 2018-09-28 PROCEDURE — 87040 BLOOD CULTURE FOR BACTERIA: CPT

## 2018-09-28 PROCEDURE — 94760 N-INVAS EAR/PLS OXIMETRY 1: CPT

## 2018-09-28 PROCEDURE — 85610 PROTHROMBIN TIME: CPT

## 2018-09-28 PROCEDURE — 85025 COMPLETE CBC W/AUTO DIFF WBC: CPT

## 2018-09-28 PROCEDURE — 97162 PT EVAL MOD COMPLEX 30 MIN: CPT

## 2018-09-28 PROCEDURE — 82803 BLOOD GASES ANY COMBINATION: CPT

## 2018-09-28 PROCEDURE — 71045 X-RAY EXAM CHEST 1 VIEW: CPT

## 2018-09-28 PROCEDURE — 83615 LACTATE (LD) (LDH) ENZYME: CPT

## 2018-09-28 PROCEDURE — 99285 EMERGENCY DEPT VISIT HI MDM: CPT

## 2018-09-28 PROCEDURE — 80053 COMPREHEN METABOLIC PANEL: CPT

## 2018-09-28 PROCEDURE — 36415 COLL VENOUS BLD VENIPUNCTURE: CPT

## 2018-09-28 PROCEDURE — 85378 FIBRIN DEGRADE SEMIQUANT: CPT

## 2018-09-28 PROCEDURE — 84484 ASSAY OF TROPONIN QUANT: CPT

## 2018-09-28 PROCEDURE — 83880 ASSAY OF NATRIURETIC PEPTIDE: CPT

## 2018-09-28 PROCEDURE — 85730 THROMBOPLASTIN TIME PARTIAL: CPT

## 2018-09-28 PROCEDURE — 94640 AIRWAY INHALATION TREATMENT: CPT

## 2018-09-28 PROCEDURE — 96372 THER/PROPH/DIAG INJ SC/IM: CPT

## 2018-09-28 PROCEDURE — 93005 ELECTROCARDIOGRAM TRACING: CPT

## 2018-09-28 PROCEDURE — 71275 CT ANGIOGRAPHY CHEST: CPT

## 2018-09-28 PROCEDURE — 85027 COMPLETE CBC AUTOMATED: CPT

## 2018-09-28 PROCEDURE — 83735 ASSAY OF MAGNESIUM: CPT

## 2018-09-28 PROCEDURE — 82550 ASSAY OF CK (CPK): CPT

## 2018-09-28 PROCEDURE — 93970 EXTREMITY STUDY: CPT

## 2018-09-28 NOTE — RAD
Date of service: 



09/28/2018



HISTORY:

Shortness of breath.



COMPARISON:

08/18/2018 



FINDINGS:



LUNGS:

Persistent lower lobe infiltrate/mass 



PLEURA:

Stable left pleural effusion.



CARDIOVASCULAR:

 No radiographic findings to suggest acute or significant 

cardiovascular disease.



OSSEOUS STRUCTURES:

No significant abnormalities.



VISUALIZED UPPER ABDOMEN:

Normal.



OTHER FINDINGS:

None.



IMPRESSION:

Stable findings left hawa thorax including left lower lobe infiltrate 

inseparable from left pleural effusion.  No significant changes.

## 2018-09-28 NOTE — ED PDOC
Arrival/HPI





- General


Chief Complaint: Lower Extremity Problem/Injury


Time Seen by Provider: 09/28/18 15:47


Historian: Patient, Family (daughter and granddaughter)





- History of Present Illness


Time/Duration: Prior to Arrival


Symptom Onset: Sudden


Symptom Course: Unchanged


Quality: Aching


Severity Level: Severe


Activities at Onset: Rest


Associated Symptoms (Text): 





09/28/18 16:07


Patient was sitting at home earlier today and developed acute onset of right 

lower extremity pain. There is a history of lung cancer. The patient and family 

have decided not to treat the cancer. History of DVT in the right lower 

extremity. The pain is in the posterior leg. He also complains of numbness in 

the posterior leg. He is more short of breath than usual. He is on chronic home 

oxygen therapy. There is a chronic cough no different from usual. No chest pain.

No fever. No sputum.





Past Medical History





- Infectious Disease


Hx of Infectious Diseases: None





- Cardiac


Hx Cardiac Disorders: Yes (CAD)


Other/Comment: DVT rt leg





- Pulmonary


Hx Respiratory Disorders: Yes (HOME O2)


Hx Chronic Obstructive Pulmonary Disease (COPD): Yes


Hx Emphysema: Yes


Hx Pneumonia: Yes





- Neurological


Hx Neurological Disorder: No





- HEENT


Hx HEENT Disorder: Yes (eyeglasses)





- Renal


Hx Renal Disorder: Yes


Hx Kidney Stones: Yes


Hx Renal Failure: Yes





- Endocrine/Metabolic


Hx Endocrine Disorders: No





- Hematological/Oncological


Hx Blood Disorders: Yes


Hx Cancer: Yes (LUNG CA NO TX)





- Integumentary


Hx Dermatological Disorder: No





- Musculoskeletal/Rheumatological


Hx Back Pain: Yes (chronic mid and lower back)





- Gastrointestinal


Hx Gastrointestinal Disorders: Yes


Hx Gastroesophageal Reflux: Yes





- Genitourinary/Gynecological


Hx Genitourinary Disorders: Yes (HEISTANCY)





- Psychiatric


Hx Psychophysiologic Disorder: No


Hx Substance Use: No





- Surgical History


Hx Cardiac Catheterization: Yes


Hx Cholecystectomy: Yes





- Anesthesia


Hx Anesthesia: No





Family/Social History





- Physician Review


Nursing Documentation Reviewed: Yes


Family/Social History: Unknown Family HX


Smoking Status: Former Smoker


Hx Alcohol Use: No


Hx Substance Use: No





Allergies/Home Meds


Allergies/Adverse Reactions: 


Allergies





No Known Allergies Allergy (Verified 09/28/18 16:06)


   








Home Medications: 


                                    Home Meds











 Medication  Instructions  Recorded  Confirmed


 


RX: Warfarin [Coumadin] 3 mg PO DAILY 05/19/16 08/19/18


 


Metoprolol Succinate [Toprol Xl] 25 mg PO DAILY 07/14/18 08/19/18


 


RX: Albuterol HFA [Ventolin HFA 90 0.09 mg IH BID 07/14/18 08/19/18





mcg/actuation (8 g)]   


 


RX: Albuterol/Ipratropium [Duoneb 3 ml IH BID 07/14/18 08/18/18





3 mg/0.5 mg (3 ml) UD]   


 


RX: Cholecalciferol [Vitamin D 2,000 iu PO DAILY 07/14/18 08/18/18





1000 IU]   


 


RX: Furosemide [Lasix] 20 mg PO DAILY 07/14/18 08/19/18


 


RX: traMADol [Ultram] 50 mg PO PRN PRN 07/14/18 08/19/18














Review of Systems





- Physician Review


All systems were reviewed & negative as marked: Yes





- Review of Systems


Constitutional: Fatigue.  absent: Fevers


Respiratory: SOB, Cough, Wheezing.  absent: Sputum


Cardiovascular: absent: Chest Pain, Palpitations, Syncope


Gastrointestinal: absent: Abdominal Pain, Nausea, Vomiting


Skin: Normal


Neurological: Other (numbness)


Psychiatric: Normal





Physical Exam





Vital Signs











  Pulse Resp BP Pulse Ox


 


 09/28/18 16:03  95 H  18  135/63  87 L











Temperature: Afebrile


Blood Pressure: Normal


Pulse: Regular


Respiratory Rate: Tachypneic


Appearance: Positive for: Well-Appearing, Non-Toxic, Uncomfortable, Other 

(accessory muscle use and retractions)


Pain Distress: Moderate


Mental Status: Positive for: Alert and Oriented X 3





- Systems Exam


Head: Present: Atraumatic, Normocephalic


Pupils: Present: PERRL


Extroacular Muscles: Present: EOMI


Conjunctiva: Present: Normal


Mouth: Present: Moist Mucous Membranes


Pharnyx: No: ERYTHEMA, EXUDATE, TONSILS ENLARGED


Respiratory/Chest: Present: Respiratory Distress, Accessory Muscle Use, Wheezes,

 Decreased Breath Sounds, Retracting, Rhonchi, Tachypneic.  No: Rales


Cardiovascular: Present: Regular Rate and Rhythm, Normal S1, S2.  No: Murmurs


Abdomen: No: Tenderness, Distention, Peritoneal Signs, Rebound, Guarding


Back: Present: Normal Inspection


Upper Extremity: Present: Normal Inspection.  No: Cyanosis, Edema


Lower Extremity: Present: Normal Inspection, CALF TENDERNESS, NORMAL PULSES, 

Normal ROM, Tenderness, Neurovascularly Intact.  No: Edema, Cyanosis, Kayode's 

Sign, Swelling, Erythema, Deformity


Neurological: Present: GCS=15, CN II-XII Intact, Speech Normal, Motor Func 

Grossly Intact


Skin: Present: Warm, Dry, Normal Color.  No: Rashes


Psychiatric: Present: Alert, Oriented x 3, Normal Insight, Normal Concentration





Medical Decision Making


ED Course and Treatment: 





09/28/18 16:41


EKG shows normal sinus rhythm with a primary AV block and a right bundle branch 

block with multifocal PVCs and nonspecific ST and T-wave changes.


09/28/18 20:24


discussed in detail with Dr. Reed who requests consults with cardiology and 

pulmonology





- RAD Interpretation


Narrative RAD Interpretations (Text): 





09/28/18 20:17


CTA chest reviewed by radiologist, shows:


No evidence of pulmonary embolic disease to the main pulmonary arteries or their

 main branches.  Moderate sized left pleural effusion. Soft tissue density and n

eoplasm measuring approximately 7 x 5.2 x 4.8 cm suspected at the medial aspect 

left lower lung and smaller lobulated soft tissue density densityand neoplasm at

 the medial aspect left upper lung.  Multichamber enlargement of the heart.  

Extensive bullos changes both lungs.  Clinical correlation advised. 


Radiology Orders: 











09/28/18 16:00


DUPLEX LOWER EXTRM VEIN BILAT [US] Stat 





09/28/18 16:01


CHEST PORTABLE [RAD] Stat 








X-ray chest one view as read by the radiologist shows a stable left pleural 

effusion and left mass.


Bilateral lower extremity Doppler shows positive right-sided popliteal DVT as 

read by the radiologist.


: Radiologist





- Medication Orders


Current Medication Orders: 














Discontinued Medications





Albuterol/Ipratropium (Duoneb 3 Mg/0.5 Mg (3 Ml) Ud)  3 ml IH STAT STA


   Stop: 09/28/18 16:01


Morphine Sulfate (Morphine)  4 mg IVP STAT STA


   Stop: 09/28/18 16:03











Disposition/Present on Arrival





- Present on Arrival


Any Indicators Present on Arrival: No


History of DVT/PE: Yes


History of Uncontrolled Diabetes: No


Urinary Catheter: No


History of Decub. Ulcer: No


History Surgical Site Infection Following: None





- Disposition


Have Diagnosis and Disposition been Completed?: Yes


Diagnosis: 


 Elevated troponin, Dyspnea, Renal insufficiency, Elevated brain natriuretic 

peptide (BNP) level, Deep vein thrombosis





Disposition: HOSPITALIZED


Disposition Time: 20:25


Patient Plan: Observation, Telemetry


Patient Problems: 


                             Current Active Problems











Problem Status Onset


 


Deep vein thrombosis Acute 


 


Dyspnea Acute 


 


Elevated brain natriuretic peptide (BNP) level Acute 


 


Elevated troponin Acute 


 


Renal insufficiency Acute 











Condition: FAIR


Referrals: 


Tae Reed DO [Primary Care Provider] - Follow up with primary


Forms:  [employer] (English)

## 2018-09-29 RX ADMIN — ARFORMOTEROL TARTRATE SCH MCG: 15 SOLUTION RESPIRATORY (INHALATION) at 07:09

## 2018-09-29 RX ADMIN — METOPROLOL SUCCINATE SCH MG: 25 TABLET, EXTENDED RELEASE ORAL at 09:00

## 2018-09-29 RX ADMIN — ARFORMOTEROL TARTRATE SCH MCG: 15 SOLUTION RESPIRATORY (INHALATION) at 19:55

## 2018-09-29 RX ADMIN — BUDESONIDE SCH MG: 0.5 SUSPENSION RESPIRATORY (INHALATION) at 19:55

## 2018-09-29 RX ADMIN — ENOXAPARIN SODIUM SCH MG: 80 INJECTION SUBCUTANEOUS at 21:17

## 2018-09-29 RX ADMIN — ENOXAPARIN SODIUM SCH MG: 80 INJECTION SUBCUTANEOUS at 09:01

## 2018-09-29 RX ADMIN — BUDESONIDE SCH MG: 0.5 SUSPENSION RESPIRATORY (INHALATION) at 07:09

## 2018-09-29 NOTE — CON
DATE:  09/29/2018

PULMONARY CONSULTATION:



The pulmonary consultation is requested by Dr. Reed.



REASON FOR PULMONARY CONSULTATION:  Chronic obstructive pulmonary disease.



HISTORY OF PRESENT ILLNESS:  History is obtained via extensive discussion

with the night nurse.  I have also discussed the case with the patient at

length, and reviewed the chart at length.



The patient is an 86-year-old male, with past medical history significant

for presumed lung cancer (family refused any/all workup and treatment after

a highly positive PET scan), advanced chronic obstructive pulmonary disease

(on home oxygen), previous deep venous thrombosis, coronary artery disease,

myocardial infarction in the past, who presents to [facility] with

worsening pain and swelling of his right lower extremity.  In the emergency

room, the patient was noted to have a right deep venous thrombosis.  He was

thus admitted for additional evaluation.



Again, I did discuss the case with the night nurse at length.  The patient

is not short of breath at rest.  He does have chronic dyspnea on exertion -

unchanged.  He also has a chronic cough with occasional sputum production -

also unchanged.  No history of chest pain, coughing up of blood or chest

pain - made worse with deep respirations.  There is no history of

temperatures, chills or infectious exposure.  There is no history of night

sweats.  There is a history of decreased appetite with weight loss over the

past 6 months.  No history of syncope or diaphoresis.  No history of recent

travel or trauma.



REVIEW OF SYSTEMS:  No history of nausea, vomiting or diarrhea.  No acute

urinary symptoms.  No new neurologic complaints.  Rest of the review of

systems is negative.



ALLERGIES:  NO KNOWN ALLERGIES.



SOCIAL HISTORY:  Positive for tobacco.  Negative for alcohol.



FAMILY HISTORY:  No inheritable diseases.



HOME MEDICATIONS:  Include Ultram, Coumadin, Toprol, Lasix, DuoNebs.



PHYSICAL EXAMINATION:

GENERAL:  The patient appears comfortable this morning.  He is not short of

breath at rest.

VITAL SIGNS:  Temperature is 98.3, pulse 91, respirations 18/20, blood

pressure 128/84.  Oxygen saturation on nasal cannula is 94%.

HEENT:  Normocephalic, atraumatic.  No JVD.

CARDIOVASCULAR:  Systolic ejection murmur at the lower left sternal border.

No S3 gallop.

LUNGS:  Decreased breath sounds at the bases.  Minimal rhonchi.  No

wheezing.

EXTREMITIES:  There is mild edema noted to both lower extremities.  There

is no cyanosis or clubbing.  The calves are nontender to palpation this

morning.

GI:  Abdomen is soft, nontender and nondistended.  Bowel sounds are

positive.

SKIN:  No acute rash.

NEUROLOGIC:  Limited at the present time.



PERTINENT LABORATORY DATA:  Doppler ultrasound was done of the right lower

extremity.  There is a positive right-sided popliteal deep venous

thrombosis noted.  CAT scan of the chest was also done.  It was done as an

angiogram protocol.  There is no pulmonary embolism seen.  There is a large

left lower lobe lung mass noted.  However, new findings - compared to

previous CAT scans - include a new left apical lung mass, as well as a

small to moderate left pleural effusion.  CBC:  White count 7.7K, hemoglobin

14.8, hematocrit 44, platelets of 155,000.  Arterial blood gas was done on

nasal cannula.  Results are:  PH 7.45, pCO2 of 26, pO2 of 62.  Complete

metabolic profile:  Chloride 108, carbon dioxide 20, BUN 31, calcium 12,

magnesium 1.5, troponin 0.14.  B-type natriuretic peptide 7730.  Rest of

the metabolic profile is within normal limits.



IMPRESSION:

1.  Left deep venous thrombosis.

2.  Advanced lung cancer.

3.  Advanced chronic obstructive pulmonary disease, on home oxygen.

4.  Coronary artery disease, positive troponin.

5.  Hypoxemia.



PLAN:  Again, I did discuss the case with the night nurse at length.  I

have also reviewed the chart at length, and discussed the case with the

patient at length.  The patient is not an adequate historian.



The patient presents to [facility] with a 1-day history of

increasing right lower extremity pain.  As above, the patient was diagnosed

with an acute deep venous thrombosis in the emergency room, and

subsequently admitted.  As above, I do know this patient - followed in our

office.  He has had multiple CAT scans in the past.  The newest CAT scan is

noted above.  Unfortunately, there is now a left upper lobe lung mass

(new), as well as a small to moderate left pleural effusion.  These are new

findings compared to the previous CAT scans, and PET scan.  Interesting to

note, that the patient is on Coumadin as an outpatient.  His INR is

therapeutic (2.62).  However, given the above history (and advanced

malignancy), I will start therapeutic Lovenox.  On physical exam, there is

only minimal bronchospasm noted.  In addition, there is only a mild

increase in the alveolar-arterial gradient.  I will start the patient on

Brovana nebulizer treatments and inhaled Pulmicort this morning.



The patient does feel better this morning and is clinically improved. 

However, the future status/prognosis for this elderly patient remains poor.

I will discuss the above with the attending physician later this morning.



Thank you very much for this pulmonary consultation.





__________________________________________

Neel Molina MD





DD:  09/29/2018 7:00:54

DT:  09/29/2018 7:04:46

Job # [id number]



MTDD

## 2018-09-29 NOTE — CON
DATE:  09/29/2018



CARDIOLOGY CONSULT



REASON FOR CONSULTATION:  Recurrent DVT as well as shortness of breath.



HISTORY OF PRESENT ILLNESS:  The history was obtained from the patient and

his daughter who was at the bedside.  The patient is an 86 years old male

who has a history of lung cancer and has been treated of right lower

extremity DVT in the past.  According to the daughter, the patient has been

having DVTs for the past 4 years and is on a blood thinner for that reason.

The patient also has a history of chronic obstructive lung disease and

nasal O2 at home.  The patient denies any history of coronary stenting in

the past and the most recent stress test was in 2016, with Lexiscan, which

was essentially normal SPECT myocardial perfusion study with normal gated

wall motion.



SOCIAL HISTORY:  The patient is a smoker.  He smokes one pack per day.  He

lives with his daughter.



MEDICATIONS:  Brovana 15 mcg inhalation every 12 hours, Colace 100 mg twice

a day, Coumadin 3 mg once a day, Lovenox 70 mg subcutaneously twice a day,

Toprol XL 25 mg once a day, Ultram 50 mg every 6 hours p.r.n.



REVIEW OF SYSTEMS:  No hemoptysis, no fever or chills, and no dizziness or

syncope.



PHYSICAL EXAMINATION

GENERAL:  The patient is an elderly male who does not appear to be in acute

distress.

VITAL SIGNS:  Blood pressure 92/63, heart rate 76, temperature 97.4, and

respirations 18.

HEENT:  Normocephalic.

CHEST:  Diffuse bilateral rhonchi.

HEART:  S1 and S2, regular.

ABDOMEN:  Soft.

EXTREMITIES:  1+ pitting edema.



LABORATORY DATA:  Hemoglobin and hematocrit 14.8 and 44.  White count and

platelet count are within normal limit.  SMA-7:  Sodium 137, potassium 4,

chloride 108, CO2 of 20.  Glucose 104.  BUN 31, creatinine 1.5.  Magnesium

is below normal at 1.5.  Troponin 0.14 and proBNP is 7730.  INR is 2.62 and

PTT 34.  Chest x-ray revealed prominent bronchovascular markings.  No acute

findings.  Chest CT angio with PE protocol revealed unremarkable CT

pulmonary angiogram.  No pulmonary embolus.  Tumor mass in the left upper

lobe and left lower lobe.  Preliminary report of venous Doppler of the

lower extremity was positive; however, the official report is still

pending.  Abdomen and pelvis CT scan on 08/18/2018, no evidence of

mechanical small bowel obstruction, small localized aneurysmal dilatation

of the lower abdominal aorta.



The echocardiographic study performed in 07/2018, which revealed ejection

fraction in the range of 50% to 55%, _____ ventricular systolic function

and severe pulmonary hypertension.



ASSESSMENT:

1.  Chronic obstructive lung disease with severe secondary pulmonary

hypertension.

2.  Right ventricular heart failure.

3.  Lung cancer.

4.  Recurrent deep venous thrombosis.



RECOMMENDATIONS:  Continue Coumadin 3 mg orally daily, therapeutic

subcutaneous Lovenox at 70 mg every 12 hours until therapeutic INR is

achieved, Toprol XL at 25 mg once a day.  A conservative medical approach

is recommended.  I did review the EKG which revealed sinus rhythm,

bifascicular block, right bundle-branch block with left anterior fascicular

block, PVCs, first-degree AV block, and no acute ST-T wave changes that

explain the mildly elevated troponin.







__________________________________________

Eloy Roberts MD



DD:  09/29/2018 14:17:10

DT:  09/29/2018 17:28:42

Job # [id number]

## 2018-09-29 NOTE — CT
Date of service: 



09/28/2018



PROCEDURE:  CT Chest with contrast (Pulmonary Angiogram)



HISTORY:

elevated dimer



COMPARISON:

None available.



TECHNIQUE:

Axial computed tomography images were obtained of the chest in the 

pulmonary arterial phase of enhancement. Coronal and sagittal 

reformatted images were created and reviewed.



Intravenous contrast dose: 100 cc Omnipaque 350



Mean Hounsfield value in the main pulmonary artery:  417.72



Radiation dose:



Total exam DLP = 591.56 mGy-cm.



This CT exam was performed using one or more of the following dose 

reduction techniques: Automated exposure control, adjustment of the 

mA and/or kV according to patient size, and/or use of iterative 

reconstruction technique.



FINDINGS:



PULMONARY ARTERIES:

Unremarkable. No pulmonary embolism. 



AORTA:

No acute findings. No thoracic aortic aneurysm. 



LUNGS:

Vertebral and left upper lobe apical mass 2.5 x 3.3 cm.



Additional lung mass left lower lobe measures 5.3 x 4.9 cm.  This is 

indistinguishable from what appears to be postobstructive pneumonitis 

affecting the left lower lobe.



No additional pulmonary nodules or masses.



Moderate-severe centrilobular emphysematous change.  Five 



PLEURAL SPACES:

Moderate left pleural effusion. 



HEART:

Unremarkable. No cardiomegaly. No significant pericardial effusion. 



LYMPH NODES:

No lymphadenopathy.



BONES, CHEST WALL:

Kyphoscoliosis, secondary moderate degenerative change..  No fracture 

or destructive lesion 



OTHER FINDINGS:

Unremarkable. 



IMPRESSION:

Unremarkable CT pulmonary angiogram. No pulmonary embolus.



Tumor masses in the left upper lobe and left lower lobe. 



Additional benign and/or incidental findings described above.



________________________________________________



Concordant results (preliminary interpretation) provided by [facility].



Procedure Completed: 19:18



Preliminary Report: Dictated and Authenticated: 19:52.



Final Interpretation: 10:33.  September 29, 2018.

## 2018-09-29 NOTE — CARD
--------------- APPROVED REPORT --------------





Date of service: 09/28/2018



EKG Measurement

Heart [id number]

TN [id number]

[id number]

[id number]

[id number]



<Conclusion>

Sinus rhythm with 1st degree AV block with occasional premature 

ventricular complexes

Right bundle branch block

LAHB

Trifasicular block

STTW changes

C/W ECG 8/18/18: PVCs are new

## 2018-09-30 VITALS — OXYGEN SATURATION: 92 %

## 2018-09-30 VITALS — RESPIRATION RATE: 18 BRPM | DIASTOLIC BLOOD PRESSURE: 59 MMHG | SYSTOLIC BLOOD PRESSURE: 101 MMHG | TEMPERATURE: 97.3 F

## 2018-09-30 VITALS — HEART RATE: 83 BPM

## 2018-09-30 LAB
ALBUMIN SERPL-MCNC: 3.4 G/DL (ref 3–4.8)
ALBUMIN/GLOB SERPL: 1.3 {RATIO} (ref 1.1–1.8)
ALT SERPL-CCNC: 19 U/L (ref 7–56)
AST SERPL-CCNC: 18 U/L (ref 17–59)
BUN SERPL-MCNC: 29 MG/DL (ref 7–21)
CALCIUM SERPL-MCNC: 12.3 MG/DL (ref 8.4–10.5)
ERYTHROCYTE [DISTWIDTH] IN BLOOD BY AUTOMATED COUNT: 17.1 % (ref 11.5–14.5)
GFR NON-AFRICAN AMERICAN: 38
HGB BLD-MCNC: 14.1 G/DL (ref 14–18)
INR PPP: 2.01
MCH RBC QN AUTO: 26.8 PG (ref 25–35)
MCHC RBC AUTO-ENTMCNC: 32.9 G/DL (ref 31–37)
MCV RBC AUTO: 81.4 FL (ref 80–105)
PLATELET # BLD: 167 10^3/UL (ref 120–450)
PMV BLD AUTO: 10.3 FL (ref 7–11)
PROTHROMBIN TIME: 23.4 SECONDS (ref 9.4–12.5)
RBC # BLD AUTO: 5.26 10^6/UL (ref 3.5–6.1)
WBC # BLD AUTO: 8.4 10^3/UL (ref 4.5–11)

## 2018-09-30 RX ADMIN — ENOXAPARIN SODIUM SCH MG: 80 INJECTION SUBCUTANEOUS at 08:24

## 2018-09-30 RX ADMIN — METOPROLOL SUCCINATE SCH MG: 25 TABLET, EXTENDED RELEASE ORAL at 10:25

## 2018-09-30 RX ADMIN — BUDESONIDE SCH MG: 0.5 SUSPENSION RESPIRATORY (INHALATION) at 09:50

## 2018-09-30 NOTE — US
HISTORY:

Leg pain and swelling. Evaluate for DVT



PHYSICIAN(S):  Shaun Luna MD.



TECHNIQUE:

Duplex sonography and color-flow Doppler with graded compression were 

used to evaluate the deep venous systems of both lower extremities.  



FINDINGS:

There is adherent, nonocclusive thrombus in the distal right 

popliteal vein.  The right femoral vein and right common femoral vein 

are normal and compressible. 



There is no sonographic evidence for deep venous thrombosis in the 

visualized segments of left lower extremity 



IMPRESSION:

Adherent, partially occlusive thrombus in the distal right popliteal 

vein.  Age indeterminate

## 2018-09-30 NOTE — HP
HISTORY OF PRESENT ILLNESS:  I know Herson very well for many years.  I

have been doing house call on him for a while.  He has a known left lung

cancer and he comes into the emergency room after developing acute right

lower extremity pain.  The family decided not to treat the cancer.  He has

a history of a DVT in the right lower extremity, it feels like the same

thing.  He is also more short of breath than usual.  He is on chronic home

oxygen about 3 L to get him at 90%.



PAST MEDICAL HISTORY:  Lung cancer, COPD, emphysema, pneumonia,

oxygen-dependent, had a history of DVT in the right leg in the past, wears

eyeglasses, kidney stones, renal failure in the past, lung cancer with no

treatment, chronic low and middle lower back pain, he has GERD, urinary

tract problems.  No substance abuse.



PAST SURGICAL HISTORY:  He had a cholecystectomy.  He had a cardiac cath.



FAMILY HISTORY:  Hypertension in the family.



SOCIAL HISTORY:  Former smoker.  No alcohol.  No drugs.



ALLERGIES:  NO KNOWN DRUG ALLERGIES.



MEDICATIONS:  He is on Coumadin, Toprol, Ventolin, DuoNebs, vitamin D,

Lasix and Ultram.



REVIEW OF SYSTEMS:  At this time, he is resting in bed.  His daughter is

with him.  He is fatigued.  He is short of breath.  He has got a cough.  He

is wheezing.  No chest pain.  No palpitations.  No abdominal pain, nausea,

vomiting.  No skin issues that he knows of.  He has got some numbness of

the left calf and leg.  Not anxious.  Not depressed.



PHYSICAL EXAMINATION:

VITAL SIGNS:  He has 95 pulse, 18 respiratory rate, 135/63 blood pressure

and 87% O2 sat in the emergency room.

GENERAL:  He is well appearing, he is nontoxic, uncomfortable, moderate

distress.  Alert and oriented x3.

HEENT:  Head is atraumatic, normocephalic.  Extraocular muscles are intact.

Pupils equal, reactive to light and accommodation.  Throat is moist.

NECK:  Supple.

HEART:  Regular rate.  A little bit fast.  Normal S1 and S2.

LUNGS:  Decreased breath sounds bilaterally.  Left side worse than the

right side.  No wheezes or rhonchi.

ABDOMEN:  Soft, nontender.  Positive bowel sounds.  No guarding.  No

rebound.  No CVA tenderness.

EXTREMITIES:  The left calf is tender, swollen.

NEUROLOGIC:  GCS is 15.  Cranial nerves II through XII grossly intact. 

Speech is normal.

SKIN:  Warm and dry.

PSYCHIATRIC:  Alert and oriented x2.  He has got a little bit of dementia.



LABORATORY DATA:  He had multiple tests done.  He has a 137 sodium,

potassium is 4, BUN 31, creatinine 1.5, GFR is 44, sugar is 104, calcium is

12, magnesium is 1.5, AST is 22, ALT is 19, alk phos 68, lactate

dehydrogenase is 477, troponin I is 0.14, BNP is high at 7730.  I changed

his Lasix from p.o. to IV.  Total protein 6.5.  His blood gas:  He has got

a 7.45 ABG, pCO2 is 26, O2 is 62, he is only satting at 91.  INR is at

2.62.  D-dimer is 767.  White count 7.7, hemoglobin 14.8, hematocrit is 44,

platelets of 155.  He had a chest CT, which shows a tumor masses in left

upper and left lower lobes.



IMPRESSION:  He is going to be in the hospital.  He has a Pulmonary

consult, Cardiology consult.  He is on Lovenox, his regular medications,

Lasix IV.  Discussed with the daughter.  He is currently in observation

level of care.  they want to be with  after I see Dr. Beebe's

note, the cardiologist.





__________________________________________

Tae Reed DO





DD:  09/29/2018 11:54:00

DT:  09/29/2018 11:57:05

Job # [id number]



DENISE

## 2018-09-30 NOTE — PN
DATE:  09/30/2018



SUBJECTIVE:  The patient complains of right leg pain.  He is mildly short

of breath, on nasal O2.



PHYSICAL EXAMINATION:

VITAL SIGNS:  Blood pressure 101/59, heart rate 84, temperature 97.3,

respirations 18.

HEENT:  Normocephalic.

CHEST:  Diffuse bilateral rhonchi.

HEART:  S1 and S2, regular.

ABDOMEN:  Soft.

EXTREMITIES:  1+ right leg edema.



LABORATORY DATA:  Today's hemoglobin and hematocrit 14.1 and 42.8.  White

count 8.4, platelet count 167,000.  Today's SMA-7:  Sodium 136, potassium

4.2, chloride 105, CO2 of 23, glucose 93, BUN 29, creatinine 1.7.  Today's

calcium is 12.3.



ASSESSMENT:

1.  Recurrent deep venous thrombosis.

2.  Chronic obstructive lung disease with severe secondary pulmonary

hypertension.

3.  Right ventricular failure.

4.  Lung carcinoma.



RECOMMENDATIONS:  Patient can be discharged from the cardiac point on

Coumadin 3 mg daily, albuterol inhaler, Toprol-XL 25 mg daily, Ultram 50 mg

every 6 hours.







__________________________________________

Eloy Roberts MD



DD:  09/30/2018 14:53:03

DT:  09/30/2018 19:55:08

Job # [id number]

## 2018-09-30 NOTE — PN
DATE:  09/30/2018



PULMONARY NOTE



SUBJECTIVE:  The patient appears comfortable this morning.  He is not short

of breath at rest.



OBJECTIVE:

VITAL SIGNS:  Temperature is 98, pulse 77, respirations 20, blood pressure

102/67.  Oxygen saturation on nasal cannula is 92-93%.

HEENT:  Normocephalic, atraumatic.  No JVD.

CARDIOVASCULAR:  Systolic ejection murmur at the lower left sternal border.

No S3 gallop.

LUNGS:  Decreased breath sounds at the bases.  Minimal loose rhonchi.  No

wheezing.

GI:  Abdomen is soft, nontender and nondistended.  Bowel sounds are

positive.

EXTREMITIES:  There is mild edema noted to both lower extremities.  There

is no cyanosis or clubbing.  The right calf is slightly tender to palpation

this morning.  The left calf is nontender to palpation.

SKIN:  No acute rash.

NEUROLOGIC:  Exam limited at the present time.



IMPRESSION:

1.  Left deep venous thrombosis.

2.  Advanced lung cancer.

3.  Advanced chronic obstructive pulmonary disease.

4.  Coronary artery disease, positive troponin.

5.  Hypoxemia.



PLAN:  The patient appears comfortable this morning.  He is not short of

breath at rest.  He does state to feeling better overall.  I did discuss

the case with the night nurse at length.  The night nurse stated that the

patient did have a fairly good night, but did get short of breath and

congested during the past shift.



On physical exam, mild bronchospasm persists.  In addition, the

alveolar-arterial gradient also persists.  I will continue with the inhaled

steroids, but change to scheduled DuoNeb treatments.  The patient remains on

therapeutic Lovenox.  Input by Cardiology is also noted.  Repeat morning

labs are pending.



Clinical status of the patient is improved - compared to the initial

presentation.  However, unfortunately, the overall status/prognosis for

this very pleasant elderly patient appears poor.  I will discuss the above

with Dr. Reed.







__________________________________________

Neel Molina MD





DD:  09/30/2018 7:09:01

DT:  09/30/2018 7:10:50

Job # [id number]



DENISE

## 2018-10-01 NOTE — DS
HISTORY OF PRESENT ILLNESS:  He is resting comfortably in bed.  He has got

a DuoNeb treatment and he is on oxygen.  He is doing better than he did

when he came in to the hospital.  He does have worsening of his lung

cancer, it is now in the lower and upper lobe of the left side.  He also

has a left DVT we have been dealing with Coumadin for long time now.  He

has also COPD, CADS, CHF.  He was doing better.  He is on Colace, Coumadin,

DuoNeb, Exelon patch, he will now be on Lovenox 70 mg subcu b.i.d.  The

patient family  give it to him, he will not be on morphine anymore. 

He will be on Mylicon, Pulmicort, Toprol and Ultram for pain.  He is

comfortable in bed, resting better.  He slept fairly well last night, he is

back to his baseline.



PHYSICAL EXAMINATION:

VITAL SIGNS:  He has a 98 temp, 77 pulse, 102/67 blood pressure, 20

respiratory rate, 92% O2 sat on 3 liters nasal cannula, which is the

baseline.

HEENT:  His head is atraumatic, normocephalic.

HEART:  Regular rate.

LUNGS:  Decreased breath sounds bilaterally, but no wheezes, rhonchi or

rales.

ABDOMEN:  Soft, nontender, positive bowel sounds.

EXTREMITIES:  Have no edema.



LABORATORY DATA:  He has a 8.4 white count, 14.1 hemoglobin, 42.3

hematocrit with 167 platelets, INR is 2.  He is on Coumadin.  He has 136

sodium, potassium 4.2, BUN 29, creatinine 1.7, GFR is 38, sugar is 93,

calcium is 12.3, AST is 18, ALT is 19, alk phos 64, troponin is 0.14.  The

cardiologist does not want to be aggressive with him under his situation.



ASSESSMENT AND PLAN:  He will be discharged today.  I discuss this at

length with the daughter.  I discussed also hospice, which he will think

about and let me know on Monday tomorrow.  He has had very good care for

him at home and also discussed home visiting nurses and homemakers, they

will call me tomorrow.  I have a very good relationship with them.  I will

be seeing him on house call and he understands the gravity and the severity

of his illness and that he is worsening and he probably does not have more

than 6 months to live, we discussed that and he will be discharged today. 

We will make the arrangements to get ambulance to take him home, discussed

that with the nurse.





__________________________________________

Tae Reed DO



DD:  09/30/2018 10:22:42

DT:  09/30/2018 11:15:07

Job # [id number]

DENISE

## 2018-10-19 ENCOUNTER — HOSPITAL ENCOUNTER (INPATIENT)
Dept: HOSPITAL 42 - ED | Age: 83
LOS: 2 days | DRG: 871 | End: 2018-10-21
Attending: FAMILY MEDICINE | Admitting: FAMILY MEDICINE
Payer: MEDICARE

## 2018-10-19 VITALS — BODY MASS INDEX: 19.6 KG/M2

## 2018-10-19 DIAGNOSIS — F02.80: ICD-10-CM

## 2018-10-19 DIAGNOSIS — I11.0: ICD-10-CM

## 2018-10-19 DIAGNOSIS — J44.1: ICD-10-CM

## 2018-10-19 DIAGNOSIS — R65.20: ICD-10-CM

## 2018-10-19 DIAGNOSIS — G30.9: ICD-10-CM

## 2018-10-19 DIAGNOSIS — Z87.891: ICD-10-CM

## 2018-10-19 DIAGNOSIS — J96.01: ICD-10-CM

## 2018-10-19 DIAGNOSIS — Z86.718: ICD-10-CM

## 2018-10-19 DIAGNOSIS — I50.9: ICD-10-CM

## 2018-10-19 DIAGNOSIS — K21.9: ICD-10-CM

## 2018-10-19 DIAGNOSIS — N28.9: ICD-10-CM

## 2018-10-19 DIAGNOSIS — R64: ICD-10-CM

## 2018-10-19 DIAGNOSIS — E83.52: ICD-10-CM

## 2018-10-19 DIAGNOSIS — Z79.01: ICD-10-CM

## 2018-10-19 DIAGNOSIS — A41.9: Primary | ICD-10-CM

## 2018-10-19 DIAGNOSIS — I25.10: ICD-10-CM
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
## 2018-10-19 DIAGNOSIS — J18.1: ICD-10-CM

## 2018-10-19 DIAGNOSIS — C34.90: ICD-10-CM

## 2018-10-19 DIAGNOSIS — I25.2: ICD-10-CM

## 2018-10-19 DIAGNOSIS — Z66: ICD-10-CM

## 2018-10-19 DIAGNOSIS — Z99.81: ICD-10-CM

## 2018-10-19 DIAGNOSIS — J44.0: ICD-10-CM

## 2018-10-19 LAB
ALBUMIN SERPL-MCNC: 3.6 G/DL (ref 3–4.8)
ALBUMIN/GLOB SERPL: 1.2 {RATIO} (ref 1.1–1.8)
ALT SERPL-CCNC: 16 U/L (ref 7–56)
APTT BLD: 38.8 SECONDS (ref 25.1–36.5)
AST SERPL-CCNC: 28 U/L (ref 17–59)
BASE EXCESS BLDV CALC-SCNC: -12.6 MMOL/L (ref 0–2)
BASOPHILS # BLD AUTO: 0.01 K/MM3 (ref 0–2)
BASOPHILS NFR BLD: 0.1 % (ref 0–3)
BILIRUB UR-MCNC: NEGATIVE MG/DL
BUN SERPL-MCNC: 24 MG/DL (ref 7–21)
CALCIUM SERPL-MCNC: 11.7 MG/DL (ref 8.4–10.5)
EOSINOPHIL # BLD: 0.1 10*3/UL (ref 0–0.7)
EOSINOPHIL NFR BLD: 1.1 % (ref 1.5–5)
ERYTHROCYTE [DISTWIDTH] IN BLOOD BY AUTOMATED COUNT: 18.3 % (ref 11.5–14.5)
GFR NON-AFRICAN AMERICAN: 44
GLUCOSE UR STRIP-MCNC: 100 MG/DL
GRANULOCYTES # BLD: 6.47 10*3/UL (ref 1.4–6.5)
GRANULOCYTES NFR BLD: 73.8 % (ref 50–68)
HGB BLD-MCNC: 14.8 G/DL (ref 14–18)
INR PPP: 3.62
LEUKOCYTE ESTERASE UR-ACNC: NEGATIVE LEU/UL
LYMPHOCYTES # BLD: 1.6 10*3/UL (ref 1.2–3.4)
LYMPHOCYTES NFR BLD AUTO: 17.9 % (ref 22–35)
MCH RBC QN AUTO: 27.6 PG (ref 25–35)
MCHC RBC AUTO-ENTMCNC: 32.3 G/DL (ref 31–37)
MCV RBC AUTO: 85.3 FL (ref 80–105)
MONOCYTES # BLD AUTO: 0.6 10*3/UL (ref 0.1–0.6)
MONOCYTES NFR BLD: 7.1 % (ref 1–6)
PH BLDV: 7.14 [PH] (ref 7.32–7.43)
PH UR STRIP: 6 [PH] (ref 4.7–8)
PLATELET # BLD: 227 10^3/UL (ref 120–450)
PMV BLD AUTO: 11.1 FL (ref 7–11)
PROT UR STRIP-MCNC: >=300 MG/DL
PROTHROMBIN TIME: 42.4 SECONDS (ref 9.4–12.5)
RBC # BLD AUTO: 5.37 10^6/UL (ref 3.5–6.1)
RBC # UR STRIP: (no result) /UL
SP GR UR STRIP: >= 1.03 (ref 1–1.03)
TROPONIN I SERPL-MCNC: 0.04 NG/ML
UROBILINOGEN UR STRIP-ACNC: 1 E.U./DL
VENOUS BLOOD FIO2: 21 %
VENOUS BLOOD GAS PCO2: 48 (ref 40–60)
VENOUS BLOOD GAS PO2: 51 MM/HG (ref 30–55)
WBC # BLD AUTO: 8.8 10^3/UL (ref 4.5–11)

## 2018-10-19 PROCEDURE — 5A09357 ASSISTANCE WITH RESPIRATORY VENTILATION, LESS THAN 24 CONSECUTIVE HOURS, CONTINUOUS POSITIVE AIRWAY PRESSURE: ICD-10-PCS | Performed by: FAMILY MEDICINE

## 2018-10-19 NOTE — ED PDOC
Arrival/HPI





- General


Chief Complaint: Respiratory Distress


Time Seen by Provider: 10/19/18 22:23


Historian: Patient





- History of Present Illness


Narrative History of Present Illness (Text): 


10/19/18 22:52


86 year old male, whose past medical history includes COPD, lung cancer, DVT, 

emphysema, chronic cough, and chronic back pain, who presents to the ED via EMS 

complaining of SOB pta. Patient also notes lower abdominal pain. Patient felt 

better upon IV and breathing treatment administered by EMS in the field. Patient

has a current, recurring blood clot in the rt leg and is on Coumadin and Lovenox

bid. Patient is currently on a nebulizer at home 4x a day. Patient denies any 

new chest pain, fever, chills, n/v/d, or any other complaints. 








Time/Duration: Prior to Arrival


Symptom Onset: Sudden


Symptom Course: Unchanged


Activities at Onset: Light


Context: Home





Past Medical History





- Provider Review


Nursing Documentation Reviewed: Yes





- Infectious Disease


Hx of Infectious Diseases: None





- Cardiac


Hx Cardiac Disorders: Yes (CAD)





- Pulmonary


Hx Chronic Obstructive Pulmonary Disease (COPD): Yes





- Neurological


Hx Neurological Disorder: No





- HEENT


Hx HEENT Disorder: Yes (eyeglasses)





- Renal


Hx Renal Failure: Yes





- Endocrine/Metabolic


Hx Endocrine Disorders: No





- Hematological/Oncological


Hx Blood Disorders: Yes


Hx Cancer: Yes (LUNG CA NO TX)





- Integumentary


Hx Dermatological Disorder: No





- Musculoskeletal/Rheumatological


Hx Musculoskeletal Disorders: Yes


Hx Back Pain: Yes (chronic mid and lower back)


Hx Falls: Yes





- Gastrointestinal


Hx Gastrointestinal Disorders: Yes


Hx Gastroesophageal Reflux: Yes





- Genitourinary/Gynecological


Hx Genitourinary Disorders: Yes (HESITANCY)





- Psychiatric


Hx Psychophysiologic Disorder: No


Hx Substance Use: No





- Surgical History


Hx Cardiac Catheterization: Yes


Hx Cholecystectomy: Yes





- Anesthesia


Hx Anesthesia: No





Family/Social History





- Physician Review


Nursing Documentation Reviewed: Yes


Family/Social History: Unknown Family HX


Smoking Status: Former Smoker


Hx Alcohol Use: No


Hx Substance Use: No





Allergies/Home Meds


Allergies/Adverse Reactions: 


Allergies





No Known Allergies Allergy (Verified 09/28/18 21:12)


   








Home Medications: 


                                    Home Meds











 Medication  Instructions  Recorded  Confirmed


 


RX: Warfarin [Coumadin] 5 mg PO DAILY 05/19/16 10/20/18


 


Metoprolol Succinate [Toprol Xl] 25 mg PO DAILY 07/14/18 10/19/18


 


RX: Albuterol HFA [Ventolin HFA 90 0.09 mg IH BID 07/14/18 10/19/18





mcg/actuation (8 g)]   


 


RX: Albuterol/Ipratropium [Duoneb 3 ml IH BID 07/14/18 10/19/18





3 mg/0.5 mg (3 ml) UD]   


 


RX: Cholecalciferol [Vitamin D 2,000 iu PO DAILY 07/14/18 10/19/18





1000 IU]   


 


RX: Furosemide [Lasix] 20 mg PO PRN PRN 07/14/18 10/19/18


 


RX: traMADol [Ultram] 50 mg PO PRN PRN 07/14/18 10/19/18














Review of Systems





- Physician Review


All systems were reviewed & negative as marked: Yes





- Review of Systems


Constitutional: Normal


Eyes: Normal


ENT: Normal


Respiratory: SOB


Cardiovascular: Normal.  absent: Chest Pain


Gastrointestinal: Abdominal Pain (lower abdominal pain).  absent: Diarrhea, 

Nausea, Vomiting


Genitourinary Male: Normal.  absent: Dysuria, Frequency


Musculoskeletal: Normal.  absent: Back Pain, Neck Pain


Skin: Normal.  absent: Rash


Neurological: Normal.  absent: Headache, Dizziness


Endocrine: Normal


Hemo/Lymphatic: Normal


Psychiatric: Normal





Physical Exam





- Physical Exam


Narrative Physical Exam (Text): 


10/19/18 23:01


Gen: NAD, cooperative, well appearing, non-toxic.


Head: NCAT.


EYES: PERRL, EOMI, conjunctiva clear, 


EARS: TMs clear


CV: (+) S1S2, RRR, no M/G/R


LUNGS: Decreased breathing sounds bilaterally, mild respiratory distress, mild 

expiratory wheezing.


Abd: Soft, NTTP, no guarding, rebound or rigidity.


Neuro: AAO x 3, GCS 15, CN 2-12 intact, motor and sensory grossly intact, 5/5 

muscle strength B/L UE's and LE's.


ext: pedal edema





Vital Signs Reviewed: Yes





Vital Signs











  Pulse Resp Pulse Ox


 


 10/19/18 21:56  102 H  18  82 L











Temperature: Afebrile


Blood Pressure: Normal


Pulse: Tachycardic


Respiratory Rate: Normal


Appearance: Positive for: Well-Appearing, Non-Toxic, Comfortable


Pain Distress: None


Mental Status: Positive for: Alert and Oriented X 3





Medical Decision Making


ED Course and Treatment: 


10/19/18 23:03


Impression: 


86 year old male presents to the ED complaining of SOB pta. 





Plan: 


-- VBG


-- EKG


-- CXR


-- Blood Culture


-- Urine C&S


-- UA


-- Tylenol





Progress Notes: 


EKG reviewed, shows Sinus tachycardia at 108 bpm. First Degree AV block. PAC's. 

Non-specific ST changes. 





EKG reviewed from 09/29/18, no significant changes. 





Upon evaluation, pt feels much better. O2 Saturation is improving. Heart rate is

 improving. Pt on bipap, O2 saturation up to 93%. 


Previous charts reviewed, pt baseline O2 saturation is 92%. 





CXR reviewed by me, shows possible infiltrate to right lower lobe. 





KADEN cyr applied for rectal temp of 95.8.





Discussion with daughter, who states pt has a living will that states he does 

not want to be maintained on life support. However, does not have a DNR/DNI. Pt 

and family have not decided on hospice at this point. 





10/19/18 23:07


Code Sepsis called. 





10/19/18 23:39


Bipap settings at 10/5 at 50%. 





10/19/18 23:51


Case discussed with Danielle Reed, who is aware and agrees with plan. Accepts pt 

for remote telly. 





10/19/18 23:58


Dr. Molina to be consulted for pulmonology. Dr Anselmo Fernandes to be consulted for 

ID.














- Lab Interpretations


Lab Results: 











                                 10/19/18 22:18 





                                   Lab Results





10/19/18 22:18: WBC 8.8, RBC 5.37, Hgb 14.8, Hct 45.8, MCV 85.3  D, MCH 27.6, 

MCHC 32.3, RDW 18.3 H, Plt Count 227, MPV 11.1 H, Gran % 73.8 H, Lymph % (Auto) 

17.9 L, Mono % (Auto) 7.1 H, Eos % (Auto) 1.1 L, Baso % (Auto) 0.1, Gran # 6.47,

 Lymph # (Auto) 1.6, Mono # (Auto) 0.6, Eos # (Auto) 0.1, Baso # (Auto) 0.01


10/19/18 22:05: POC Glucose (mg/dL) 138 H











- RAD Interpretation


Radiology Orders: 











10/19/18 22:14


CHEST PORTABLE [RAD] Stat 














- Scribe Statement


The provider has reviewed the documentation as recorded by the Scribe


Emily Coy





All medical record entries made by the Scribe were at my direction and 

personally dictated by me. I have reviewed the chart and agree that the record 

accurately reflects my personal performance of the history, physical exam, 

medical decision making, and the department course for this patient. I have also

 personally directed, reviewed, and agree with the discharge instructions and 

disposition.








Disposition/Present on Arrival





- Present on Arrival


Any Indicators Present on Arrival: Yes


History of DVT/PE: Yes


History of Uncontrolled Diabetes: No


Urinary Catheter: No


History of Decub. Ulcer: No


History Surgical Site Infection Following: None





- Disposition


Have Diagnosis and Disposition been Completed?: Yes


Diagnosis: 


 Elevated brain natriuretic peptide (BNP) level, Respiratory failure, COPD with 

exacerbation, Sepsis, Hypothermia





Disposition: HOSPITALIZED


Disposition Time: 23:51


Patient Plan: Admission


Patient Problems: 


                             Current Active Problems











Problem Status Onset


 


COPD with exacerbation Acute 


 


Elevated brain natriuretic peptide (BNP) level Acute 


 


Respiratory failure Acute 











Condition: FAIR

## 2018-10-20 LAB
ALBUMIN SERPL-MCNC: 3.5 G/DL (ref 3–4.8)
ALBUMIN/GLOB SERPL: 1.3 {RATIO} (ref 1.1–1.8)
ALT SERPL-CCNC: 27 U/L (ref 7–56)
APPEARANCE UR: (no result)
AST SERPL-CCNC: 23 U/L (ref 17–59)
BACTERIA #/AREA URNS HPF: (no result) /[HPF]
BASE EXCESS BLDV CALC-SCNC: -7.5 MMOL/L (ref 0–2)
BUN SERPL-MCNC: 28 MG/DL (ref 7–21)
CALCIUM SERPL-MCNC: 12.4 MG/DL (ref 8.4–10.5)
COLOR UR: YELLOW
GFR NON-AFRICAN AMERICAN: 38
INR PPP: 3.93
PH BLDV: 7.33 [PH] (ref 7.32–7.43)
PROTHROMBIN TIME: 46.5 SECONDS (ref 9.4–12.5)
VENOUS BLOOD FIO2: 21 %
VENOUS BLOOD GAS PCO2: 33 (ref 40–60)
VENOUS BLOOD GAS PO2: 51 MM/HG (ref 30–55)
WBC #/AREA URNS HPF: (no result) /HPF (ref 0–6)

## 2018-10-20 RX ADMIN — BUDESONIDE SCH MG: 0.5 SUSPENSION RESPIRATORY (INHALATION) at 19:59

## 2018-10-20 RX ADMIN — ARFORMOTEROL TARTRATE SCH MCG: 15 SOLUTION RESPIRATORY (INHALATION) at 19:59

## 2018-10-20 RX ADMIN — IPRATROPIUM BROMIDE AND ALBUTEROL SULFATE SCH ML: .5; 3 SOLUTION RESPIRATORY (INHALATION) at 13:41

## 2018-10-20 RX ADMIN — CEFEPIME SCH MLS/HR: 1 INJECTION, SOLUTION INTRAVENOUS at 21:26

## 2018-10-20 RX ADMIN — IPRATROPIUM BROMIDE AND ALBUTEROL SULFATE SCH: .5; 3 SOLUTION RESPIRATORY (INHALATION) at 13:42

## 2018-10-20 RX ADMIN — METHYLPREDNISOLONE SODIUM SUCCINATE SCH MG: 40 INJECTION, POWDER, FOR SOLUTION INTRAMUSCULAR; INTRAVENOUS at 21:27

## 2018-10-20 RX ADMIN — METHYLPREDNISOLONE SODIUM SUCCINATE SCH MG: 40 INJECTION, POWDER, FOR SOLUTION INTRAMUSCULAR; INTRAVENOUS at 12:35

## 2018-10-20 RX ADMIN — IPRATROPIUM BROMIDE AND ALBUTEROL SULFATE SCH: .5; 3 SOLUTION RESPIRATORY (INHALATION) at 20:00

## 2018-10-20 RX ADMIN — ENOXAPARIN SODIUM SCH MG: 80 INJECTION SUBCUTANEOUS at 21:25

## 2018-10-20 RX ADMIN — IPRATROPIUM BROMIDE AND ALBUTEROL SULFATE SCH ML: .5; 3 SOLUTION RESPIRATORY (INHALATION) at 09:55

## 2018-10-20 RX ADMIN — BUDESONIDE SCH MG: 0.5 SUSPENSION RESPIRATORY (INHALATION) at 13:41

## 2018-10-20 RX ADMIN — ENOXAPARIN SODIUM SCH MG: 80 INJECTION SUBCUTANEOUS at 10:41

## 2018-10-20 RX ADMIN — IPRATROPIUM BROMIDE AND ALBUTEROL SULFATE SCH ML: .5; 3 SOLUTION RESPIRATORY (INHALATION) at 19:59

## 2018-10-20 NOTE — CARD
--------------- APPROVED REPORT --------------





Date of service: 10/19/2018



EKG Measurement

Heart [id number]

IA [id number]

[id number]

[id number]

[id number]



<Conclusion>

Sinus tachycardia with 1st degree AV block with premature atria and 

ventricular complexes

Right bundle branch block

Left anterior fascicular block

Abnormal ECG

## 2018-10-20 NOTE — CON
DATE:  10/20/2018



SUBJECTIVE:  The patient was seen and examined at bedside on telemetry with

the following history of present illness.



HISTORY OF PRESENT ILLNESS:  There is a history of shortness of breath that

brought the patient to emergency room.  He also has a past medical history

of chronic obstructive pulmonary disease, lung cancer, deep vein

thrombosis, emphysema, and chronic back pain.  The patient was seen in the

emergency room, diagnosed with exacerbation of chronic obstructive

pulmonary disease and suspected pneumonia, and transferred to telemetry.



PAST MEDICAL HISTORY:  As documented in history of present illness. 

Positive for heart disease, positive for chronic obstructive pulmonary

disease, positive for lung cancer and chronic back pain.



FAMILY HISTORY:  Negative for inherited diseases.



SOCIAL HISTORY:  He is a former smoker and nondrinker.  Never used illicit

drugs.



ALLERGIES:  NO KNOWN ALLERGIES.



HOME MEDICATIONS:  Included albuterol, warfarin, furosemide, metoprolol,

and tramadol.



REVIEW OF SYSTEMS:  Conducted by reviewing all sources.

CONSTITUTIONAL:  Negative for fever.

RESPIRATORY:  Positive shortness of breath.

CARDIOVASCULAR:  Absent chest pain.

GASTROINTESTINAL:  Positive for abdominal pain, lower abdomen.  Absent

diarrhea, nausea, and vomiting.



The rest of the systems were reviewed and found to be negative.



PHYSICAL EXAMINATION

VITAL SIGNS:  His pulse is 102; respirations 18; pulse oximetry, currently

is 90 on high flow oxygen.

HEAD:  Normocephalic and atraumatic.

EARS, NOSE, AND THROAT:  Within normal limits.

NECK:  Supple with no jugular vein distention.

CARDIOVASCULAR:  S1, S2.  No S3, irregular.

PULMONARY:  Diminished breath sounds bilaterally with few rhonchi, left

more than right.

GI:  Soft and nontender.  No organomegaly.

EXTREMITIES:  No pedal edema.

SKIN:  No acute skin rash.

NEUROLOGIC:  Limited at present time.



LABORATORY DATA:  Reviewed.  First, hypoxia with low levels of oxygen

noted.  Patient started on BiPAP at night and high flow oxygen during the

day.  His WBCs were 8.8, hemoglobin of 14.8.



DIAGNOSTIC DATA:  Portable chest x-ray was reviewed by me.  This reveals

haziness at the left lung base, which could be a combination of small

effusion and infiltrate versus atelectasis.  Given the clinical

presentation, left lower lobe pneumonia is a good possibility.



ASSESSMENT:

1.  Left lower lobe pneumonia.

2.  Exacerbation of chronic obstructive pulmonary disease.

3.  History of lung cancer.

4.  Hypoxia.



PLAN:  The patient was started on intravenous Zosyn.  We will administer

nebulizer treatment with Brovana and budesonide.  He is also on low-dose

intravenous steroids.  His condition is starting to stabilize but remains

extremely guarded.







__________________________________________

Poli Quinn MD





DD:  10/20/2018 12:38:16

DT:  10/20/2018 21:54:45

Job # [id number]

## 2018-10-20 NOTE — HP
HISTORY OF PRESENT ILLNESS:  I know Herson for many years.  I have doing

house calls on him for long time.  He has lung cancer.  He has got COPD. 

He has got CHF, DVT, emphysema, chronic back pain, chronic cough, shortness

of breath on 3-4 liters of oxygen at home.  He presents with acute

shortness of breath again despite the oxygen at home and increasing

medications at home.  He failed outpatient treatment.  He is on Coumadin

and Lovenox for recurrent blood clots in the leg which could happen with

cancer.



PAST MEDICAL HISTORY:  He has COPD, CAD, renal failure, lung cancer with

two masses, chronic back pain, falls, reflux and urinary hesitancy.



PAST SURGICAL HISTORY:  Cholecystectomy history and cardiac cath history.



FAMILY HISTORY:  Unknown.



SOCIAL HISTORY:  Former smoker.  No alcohol.  No drugs.



ALLERGIES:  NO KNOWN DRUG ALLERGIES.



MEDICATIONS:  He is on Coumadin, Ventolin, DuoNebs, vitamin D, Lasix,

Toprol, Ultram and Lovenox.



REVIEW OF SYSTEMS:  He has no acute vision or hearing changes.  He is

presently confused from dementia.  No sore throat.  He is shortness of

breath, on oxygen.  No chest pain.  No abdominal pain.  He has back pain. 

No problems with urinating.  No rashes.  No headaches.



PHYSICAL EXAMINATION:

VITAL SIGNS:  He has a 102 pulse, 18 respiratory rate, 82% O2 sat on 2

liters.  He is definitely hypoxic.

HEENT:  His head is atraumatic and normocephalic.  He is toxic-appearing

and short of breath.  Extraocular muscles are intact.  Pupils are equal and

reactive to light.  Throat is moist.

NECK:  Supple.

HEART:  Regular rate.  Normal S1, S2.

LUNGS:  Decreased breath sounds but clear to auscultation bilaterally. 

Occasional wheeze, it changes with cough.

ABDOMEN:  Soft, nontender.  Positive bowel sounds.  No guarding.  No

rebound.  No CVA tenderness.

NEUROLOGIC:  GCS is 15.  Cranial nerves II through XII grossly intact. 

Motor skills are okay.  His speech is okay.

EXTREMITIES:  He has got +1/4 pitting edema in bilateral lower extremities,

which is sort of new for him.

SKIN:  Okay.

LYMPHATICS:  No palpable thyroid.  No palpable lymphadenopathy.

GENERAL:  He is short of breath, on oxygen.  Alert and oriented x3.



LABORATORY DATA:  He has a urine which showed 100 glucose, negative for

leukocytes.  Sodium 141, potassium 4.3, BUN 24, creatinine 1.5, GFR is 44,

sugar is 135, calcium is 11.7, total bili is 0.6, AST is 28, ALT is 69, alk

phos 52.  Lactate dehydrogenase is 484.  Total creatine kinase is 58. 

Troponin I 0.04.  BNP is very high at 7650.  Total protein is 6.5, albumin

is 2.6.  His lactate was 5.9, now it is down to 3.2, still elevated; 3.62

INR, hold the Coumadin.  White count 8.8, hemoglobin 14.8, hematocrit 45.8

with 227 platelets.  Chest x-ray shows progressive changes left hemithorax,

increasing left lower lobe infiltrate, larger left pleural effusion.



ASSESSMENT AND PLAN:  He will have some Solu-Medrol, some Zosyn IV.  He

will have DuoNebs, Lovenox, budesonide, Solu-Medrol.  He will be consulted

with Pulmonary and Infectious Disease.  We will do the best we can to get

him better.  He is here for pneumonia, chronic obstructive pulmonary

disease, lung cancer, congestive heart failure, hypoxic.





__________________________________________

Tae Reed DO



DD:  10/20/2018 11:14:23

DT:  10/20/2018 11:17:00

Job # [id number]

## 2018-10-20 NOTE — RAD
Date of service: 



10/19/2018



HISTORY:

 CHEST PAIN 



COMPARISON:

09/28/2018 



FINDINGS:



LUNGS:

Increasing consolidative change left lower lobe.



PLEURA:

Increasing right pleural effusion.



CARDIOVASCULAR:

No atherosclerotic calcification present



 No radiographic findings to suggest acute or significant 

cardiovascular disease.



OSSEOUS STRUCTURES:

No significant abnormalities.



VISUALIZED UPPER ABDOMEN:

Normal.



OTHER FINDINGS:

None.



IMPRESSION:

Progressive changes left hawa thorax including increasing left lower 

lobe infiltrate and larger left pleural effusion.

## 2018-10-20 NOTE — PCM.SEPTIC
<Lenin Montejo - Last Filed: 10/20/18 01:54>





Sepsis Progress Note





- Reassessment Type


Date of Evaluation: 10/20/18


Time of Evaluation: 01:54


Reassessment Type: Non-invasive reassessment





- Non Invasive Reassessment


Were the most recent vital sign reviewed: Yes


Vital Sign (Latest): 


                                        











Temp Pulse Resp BP Pulse Ox


 


 96.4 F L  92 H  18   104/70   96 


 


 10/20/18 00:30  10/20/18 00:44  10/20/18 00:44  10/20/18 00:44  10/20/18 00:44








Cardiovascular: Yes: Regular Rate, Rhythm.  No: Chest Non Tender, Edema, Gallop,

JVD, Bradycardia, Tachycardia


Respiratory: Yes: Crackles.  No: Accessory Muscle Use, Respiratory Distress


Capillary Refill: Delayed


Skin: Normal Color, Warm, Dry





<Jenny Moreno - Last Filed: 10/20/18 06:40>





Sepsis Progress Note





- Non Invasive Reassessment


Vital Sign (Latest): 


                                        











Temp Pulse Resp BP Pulse Ox


 


 97.9 F   902 H  18   99/60 L  91 L


 


 10/20/18 06:00  10/20/18 06:00  10/20/18 06:00  10/20/18 06:00  10/20/18 06:00











Attending/Attestation





- Attestation


I have personally seen and examined this patient.: No


I have fully participated in the care of the patient.: No


I have reviewed all pertinent clinical information, including history, physical 

exam and plan: No

## 2018-10-21 VITALS — RESPIRATION RATE: 13 BRPM | DIASTOLIC BLOOD PRESSURE: 34 MMHG | HEART RATE: 85 BPM | SYSTOLIC BLOOD PRESSURE: 76 MMHG

## 2018-10-21 VITALS — TEMPERATURE: 98.4 F

## 2018-10-21 VITALS — OXYGEN SATURATION: 96 %

## 2018-10-21 LAB
ALBUMIN SERPL-MCNC: 3.6 G/DL (ref 3–4.8)
ALBUMIN/GLOB SERPL: 1.2 {RATIO} (ref 1.1–1.8)
ALT SERPL-CCNC: 26 U/L (ref 7–56)
AST SERPL-CCNC: 20 U/L (ref 17–59)
BUN SERPL-MCNC: 30 MG/DL (ref 7–21)
CALCIUM SERPL-MCNC: 12.6 MG/DL (ref 8.4–10.5)
ERYTHROCYTE [DISTWIDTH] IN BLOOD BY AUTOMATED COUNT: 17.6 % (ref 11.5–14.5)
GFR NON-AFRICAN AMERICAN: 34
HGB BLD-MCNC: 13.5 G/DL (ref 14–18)
INR PPP: 2.8
MCH RBC QN AUTO: 27.4 PG (ref 25–35)
MCHC RBC AUTO-ENTMCNC: 33.3 G/DL (ref 31–37)
MCV RBC AUTO: 82.4 FL (ref 80–105)
PLATELET # BLD: 210 10^3/UL (ref 120–450)
PMV BLD AUTO: 10.6 FL (ref 7–11)
PROTHROMBIN TIME: 32.9 SECONDS (ref 9.4–12.5)
RBC # BLD AUTO: 4.93 10^6/UL (ref 3.5–6.1)
WBC # BLD AUTO: 3.9 10^3/UL (ref 4.5–11)

## 2018-10-21 RX ADMIN — METHYLPREDNISOLONE SODIUM SUCCINATE SCH MG: 40 INJECTION, POWDER, FOR SOLUTION INTRAMUSCULAR; INTRAVENOUS at 09:54

## 2018-10-21 RX ADMIN — IPRATROPIUM BROMIDE AND ALBUTEROL SULFATE SCH ML: .5; 3 SOLUTION RESPIRATORY (INHALATION) at 07:34

## 2018-10-21 RX ADMIN — ENOXAPARIN SODIUM SCH MG: 80 INJECTION SUBCUTANEOUS at 08:52

## 2018-10-21 RX ADMIN — BUDESONIDE SCH MG: 0.5 SUSPENSION RESPIRATORY (INHALATION) at 13:32

## 2018-10-21 RX ADMIN — CEFEPIME SCH MLS/HR: 1 INJECTION, SOLUTION INTRAVENOUS at 09:49

## 2018-10-21 RX ADMIN — IPRATROPIUM BROMIDE AND ALBUTEROL SULFATE SCH: .5; 3 SOLUTION RESPIRATORY (INHALATION) at 13:32

## 2018-10-21 RX ADMIN — IPRATROPIUM BROMIDE AND ALBUTEROL SULFATE SCH: .5; 3 SOLUTION RESPIRATORY (INHALATION) at 01:22

## 2018-10-21 RX ADMIN — IPRATROPIUM BROMIDE AND ALBUTEROL SULFATE SCH ML: .5; 3 SOLUTION RESPIRATORY (INHALATION) at 01:22

## 2018-10-21 RX ADMIN — IPRATROPIUM BROMIDE AND ALBUTEROL SULFATE SCH ML: .5; 3 SOLUTION RESPIRATORY (INHALATION) at 13:32

## 2018-10-21 RX ADMIN — BUDESONIDE SCH MG: 0.5 SUSPENSION RESPIRATORY (INHALATION) at 07:34

## 2018-10-21 RX ADMIN — ARFORMOTEROL TARTRATE SCH MCG: 15 SOLUTION RESPIRATORY (INHALATION) at 07:34

## 2018-10-21 NOTE — CP.PCM.PRO
<Ean Holly - Last Filed: 10/21/18 20:15>





Pronouncement of Death Note





- Clinical Findings


Physical Exam: No Response Verbal/Painful Stimuli, Absent Peripheral 

Pulses{Carotid & Femoral}, Absent Heart & Breath Sounds, No Pupillary Light 

Reflex, No Corneal Reflex, Pupils Fixed & Dilated, Absence of Vital Signs





- Pronouncement Time


Time of Pronouncement of Death: 07:45





- Notifications


Pronouncement Notifications: Family Notified, Atending Notified





- Autopsy


Autopsy Requested: No





- [place]


[place]: [id number]





<Lorraine Rodas - Last Filed: 10/21/18 21:44>





Attending/Attestation





- Attestation


I have personally seen and examined this patient.: Yes


I have fully participated in the care of the patient.: Yes


I have reviewed all pertinent clinical information: Yes

## 2018-10-21 NOTE — PN
DATE:  10/21/2018



SUBJECTIVE:  The patient is in bed, in no acute distress, nontoxic.  No

fevers and no chills.



PHYSICAL EXAMINATION:

VITAL SIGNS:  Temperature is 98, blood pressure is 120/80, respiratory rate

of 18.

HEENT:  Unremarkable.

NECK:  Supple.

LUNGS:  Have decreased breath sounds.

HEART:  Normal S1, S2.

ABDOMINAL:  Soft, nontender.



LABORATORY EXAMINATION:  Reveals the blood cultures are negative.  White

count is 3.9, hemoglobin of 13, platelets of 210.  BUN of 30, creatinine of

1.9.  Review of orders reveals the urine for Legionella antigen is pending.

The patient is on p.o. doxycycline, IV cefepime, Solu-Medrol.



ASSESSMENT AND PLAN:  An 86-year-old male with a history of chronic

obstructive lung disease, history of lung cancer, history of deep vein

thrombosis, history of emphysema, chronic cough, chronic back pain, kidney

disease, admitted through the emergency room, weakness and shortness of

breath with severe sepsis secondary to healthcare-associated pneumonia,

gram-positive cocci versus gram-negative de.  Today is day #2 of Maxipime

and doxycycline.  Remainder of the workup is pending.  The patient's

procalcitonin is 0.33 and does have an elevated calcium and increase in

creatinine.  We would recommend 4 to 7 days of antibiotics, today is day #2

of doxycycline p.o. and Maxipime of 4-7 days.  Overall prognosis is quite

poor for this patient who appears chronically ill, cachectic with a BMI of

19.





__________________________________________

Daniele Vladez MD



DD:  10/21/2018 8:59:35

DT:  10/21/2018 9:01:18

Job # [id number]

## 2018-10-21 NOTE — PCM.RRT
RRT Nurse Assessment





- Situation


Date: 10/21/18


Time RRT was called: 06:53


RRT Responder Arrival Time: 06:54


RRT Location:: [address] [place] [address]


RRT Reason for Call: Change in Mental Status





- IV


IV Inserted during RRT?: Yes


IV Fluids Initiated During RRT?: Fluids was administered





- Respiratory


Oxygen Delivery Method: Intubated


Was the Patient Ventilated with Bag/Mask 100% O2?: Yes


Secretions Suctioned?: No


Was the Patient Intubated?: Yes


Was the Patient Placed on a Ventilator?: Yes





- Diagnostic Test Ordered


Chest X-Ray: Yes





- Stat Labs Ordered


RRT Stat Labs Ordered: TROPONIN, ABG


CPR started during RRT?: Yes





- Finger Stick Blood Glucose


Finger Stick Blood Glucose: 202





- Neurological Status


(Select all that apply): absent: Alert, Responsive, Oriented, Verbal, Follows 

Commands, Disoriented, Confused, Lethargic, Aggressive, Weakness





- Respiratory


Oxygen Delivery Method: Intubated





Plan





- Assessment of Findings&Treatment Plan





Rapid response was called for patient in 270-1. By time of arrival rapid 

response was converted to code blue. Patient started receiving chest 

compressions and ekg leads were being put on patient. Compressions continued wi

th changes every 2 minutes between individuals. Patient achieved ROSC after 1 

time dose of epinephrine as per ACLS protocol and compressions were stopped. 

Patient was intubated during the time of ROSC and sounds were heard with bag 

mask on ascultation. Patient once again became pulsess and chest compressions 

were started with fluids also being administered. Another 1x dose of epinephrine

was administered and patient achieved ROSC and was transferred to ICU. Patient's

family was spoken to by Dr. Ho who made the family aware of the full 

situation. Dr. Reed was made aware of the situation. After transfer to ICU 

patient had two more episodes of pulselessness. Patient's family was educated 

about the situation and patient was made DNR/DNI. After the second pulsesless 

activity noted in ICU chest compressions were not initiated.

## 2018-10-21 NOTE — PN
DATE:  10/21/2018



PULMONARY PROGRESS NOTE



SUBJECTIVE:  The patient was seen and examined at the bedside with the

family present at bedside.  He is currently on high-flow oxygen.  His

oxygen saturation is 95% and he appears to be much less short of breath.



PHYSICAL EXAMINATION:

VITAL SIGNS:  His temperature is 98.2, pulse is 98, respirations 18, pulse

oximetry is 96 on high flow, blood pressure is 123/87.

HEENT:  Examination of head, ears, nose and throat is within normal limits.

NECK:  Supple with no jugular vein distention.

CARDIOVASCULAR:  S1, S2.  No S3.  Irregular.

PULMONARY:  Diminished breath sounds at both bases with rhonchi at both

bases, left more than right.

GASTROINTESTINAL:  Soft, nontender.  No organomegaly.

EXTREMITIES:  No pedal edema.

SKIN:  No acute skin rash.

NEUROLOGIC:  Limited at present time.



ASSESSMENT:

1.  Left lower lobe pneumonia.

2.  Severe sepsis.

3.  Advanced lung cancer.

4.  Advanced chronic obstructive pulmonary disease.

5.  Renal insufficiency.

6.  Hypercalcemia.



PLAN:  The patient appears to be improving.  Case discussed with nursing as

well as Dr. Valdez's input.  Antibiotics were changed by Infectious

Disease to Maxipime and doxycycline and piperacillin and tazobactam were

discontinued due to high sodium load.  The patient appears to be improved

on that regimen.  His serum creatinine is 1.9, calcium is elevated at 12.6.

WBC 3.9, hemoglobin of 13.5.  We will continue with current antibiotic

administration, low-dose intravenous steroids, nebulizer treatment and

high-flow oxygen.  When the oxygen improves further, he can be switched to

nasal cannula.





__________________________________________

Poli Quinn MD





DD:  10/21/2018 10:35:38

DT:  10/21/2018 10:57:22

Job # [id number]

## 2018-10-21 NOTE — CP.PCM.PN
Subjective





- Date & Time of Evaluation


Date of Evaluation: 10/21/18


Time of Evaluation: 21:32





- Subjective


Subjective: 





Reported to Dr. Little in 270-01.


 was leading CODE BLUE team, he intubated .


After getting pulse, and blood pressure back ,  was transferred to CCU-

07.


He coded again in the unit, ACLS protocols followed.


Patient's wife was talked to by  and DNr order was obtained.


When we lost pulse third time, we were not able to resuscitate patient after 

administration of 1 mg of Epinephrine IV.


Patient was pronounced  at  7.45 PM.


Nurse was instructed to inform .


CC time spent : 30 minutes.





Objective





- Vital Signs/Intake and Output


Vital Signs (last 24 hours): 


                                        











Temp Pulse Resp BP Pulse Ox


 


 98.4 F   96 H  20   102/72   96 


 


 10/21/18 18:00  10/21/18 18:00  10/21/18 18:00  10/21/18 18:00  10/21/18 06:00








Intake and Output: 


                                        











 10/21/18 10/22/18





 18:59 06:59


 


Intake Total 100 720


 


Output Total  750


 


Balance 100 -30














- Medications


Medications: 


                               Current Medications





Acetaminophen (Tylenol 325mg Tab)  650 mg PO Q6H PRN


   PRN Reason: Pain, Mild (1-3)


Albuterol/Ipratropium (Duoneb 3 Mg/0.5 Mg (3 Ml) Ud)  3 ml IH [id number] [facility]


   Last Admin: 10/21/18 13:32 Dose:  3 ml


Albuterol/Ipratropium (Duoneb 3 Mg/0.5 Mg (3 Ml) Ud)  3 ml IH [id number] [facility]


   Last Admin: 10/21/18 13:32 Dose:  Not Given


Arformoterol Tartrate (Brovana)  15 mcg IH [id number] [facility]


   Last Admin: 10/21/18 07:34 Dose:  15 mcg


Budesonide (Pulmicort Respules)  0.5 mg IH [id number] [facility]


   Last Admin: 10/21/18 13:32 Dose:  0.5 mg


Docusate Sodium (Colace)  100 mg PO DAILY [facility]


Doxycycline Hyclate (Doryx)  100 mg PO Q12 [facility]; Protocol


   Stop: 10/29/18 22:01


   Last Admin: 10/21/18 09:54 Dose:  100 mg


Enoxaparin Sodium (Lovenox)  70 mg SC Q12H [facility]; Protocol


   Last Admin: 10/21/18 08:52 Dose:  70 mg


Furosemide (Lasix)  40 mg IVP DAILY [facility]


   Last Admin: 10/21/18 09:54 Dose:  40 mg


Cefepime HCl (Maxipime 1gm)  1 gm in 100 mls @ 100 mls/hr IVPB Q12 [facility]; Protocol


   Stop: 10/29/18 22:01


   Last Admin: 10/21/18 09:49 Dose:  100 mls/hr


Methylprednisolone (Solu-Medrol)  40 mg IVP Q12 [facility]


   Last Admin: 10/21/18 09:54 Dose:  40 mg


Metoprolol Succinate (Toprol Xl)  25 mg PO BRK [facility]


   Last Admin: 10/21/18 08:52 Dose:  25 mg


Rivastigmine (Exelon 9.5 Mg/24 Hr Patch)  1 patch TD DAILY [facility]


   Last Admin: 10/21/18 09:54 Dose:  1 patch


Tramadol HCl (Ultram)  50 mg PO TID PRN


   PRN Reason: Pain, moderate (4-7)


   Last Admin: 10/21/18 11:31 Dose:  50 mg


Warfarin Sodium (Coumadin)  5 mg PO 1800 [facility]; Protocol


   Last Admin: 10/21/18 17:26 Dose:  5 mg











- Labs


Labs: 


                                        





                                 10/21/18 07:00 





                                 10/21/18 07:00 





                                        











PT  32.9 SECONDS (9.4-12.5)  H  10/21/18  07:00    


 


INR  2.80   10/21/18  07:00    


 


APTT  38.8 Seconds (25.1-36.5)  H  10/19/18  22:18    














Attending/Attestation





- Attestation


I have personally seen and examined this patient.: Yes


I have fully participated in the care of the patient.: Yes


I have reviewed all pertinent clinical information, including history, physical 

exam and plan: Yes

## 2018-10-21 NOTE — CON
DATE:  10/20/2018



LOCATION:  The patient seen in room 270, bed 1.  The patient's daughter at

the bedside.



CHIEF COMPLAINT:  Weakness times several days.



HISTORY OF PRESENT ILLNESS:  This is an 86-year-old male with a history of

chronic obstructive lung disease, history of lung cancer, history of DVT,

history of emphysema and chronic cough, chronic back pain who also has

kidney disease, was admitted to the emergency room with weakness and

shortness of breath for several days.



REVIEW OF SYSTEMS:  Reveals mild shortness of breath, low-grade fevers,

cough - nonproductive, occasional abdominal pain.  No diarrhea or

constipation.  No dysuria or frequency.  No headache.  No blurred vision. 

A 12-point review of systems is performed.



PAST MEDICAL HISTORY:  Significant for end-stage chronic obstructive lung

disease, lung cancer, DVT, emphysema, kidney disease, coronary artery

disease, myocardial infarction, congestive heart failure, arthritis,

anxiety, Alzheimer's, questionable tuberculosis in the past according to

nursing note.



PAST SURGICAL HISTORY:  Significant for cardiac catheterization,

cholecystectomy.



ALLERGIES:  THE PATIENT HAS NO KNOWN ALLERGIES.



MEDICATIONS: Medications at home include Coumadin, tramadol, _____, Lasix.



PHYSICAL EXAMINATION

GENERAL:  The patient is in bed.

VITAL SIGNS:  The patient did have a temperature of 96.4, and it was down

to 95.8 in the emergency room.  Blood pressure was 89/50.  Respiratory rate

of 20, it is up to 23.  Heart rate was up to 97.

HEENT:  Unremarkable.

NECK:  Supple.

CARDIOPULMONARY:  Normal S1, S2.

LUNGS:  Decreased breath sounds.

ABDOMEN:  Soft, nontender.



LABORATORY DATA:  Reveals a white count of 8.8, hemoglobin 14, and

platelets are 227.  Chemistries reveal a BUN of 28, creatinine is 1.7,

calcium is 12.4.  The BNP is 7650.  Urinalysis is noted.  Microbiology is

pending.



The patient had a chest x-ray.  Consolidative changes.



ASSESSMENT AND PLAN:  He is an 86-year-old male with lung cancer, chronic

obstructive pulmonary disease, emphysema, kidney disease, coronary artery

disease, myocardial infarction, and Alzheimer's who is presenting with

hypothermia, dyspnea, tachycardia, hypotension with severe sepsis secondary

to healthcare associated pneumonia, rule out gram-positive cocci versus

gram-negative rods pneumonia.  We will treat the patient with Maxipime,

doxycycline pending blood cultures, urine cultures, sputum cultures.  We

will order procalcitonin nasal MRSA screen.  We will discontinue the Zosyn

due to a history of congestive heart failure, sodium load pending

pancultures.  We will also order a urine for Legionella antigen.  We will

make further recommendations, although prognosis is quite poor for this

patient _____.  We will follow with you.







__________________________________________

Daniele Valdez MD





DD:  10/20/2018 16:30:03

DT:  10/21/2018 2:33:48

Job # [id number]

## 2018-10-21 NOTE — PN
DATE:  10/21/2018



SUBJECTIVE:  I saw Herson lying in his bed.  He has high flow oxygen on. 

He is more comfortable today.  Breathing better.  Ate some.



PHYSICAL EXAMINATION:

VITAL SIGNS:  He has a 98.2 temperature, 98 pulse, 123/87 blood pressure,

18 respiratory rate, 96% O2 sat on high flow.

HEENT:  Head:  Atraumatic, normocephalic.

HEART:  Regular rate.

LUNGS:  Decreased breath sounds bilaterally.  No wheezes or rhonchi with

poor inspiration.

ABDOMEN:  Soft, nontender.  Positive bowel sounds.

EXTREMITIES:  Better, less edema in the extremities, maybe trace.



MEDICATIONS:  He is currently on Brovana, Coumadin which was sought up

again today, Doryx, DuoNebs, Exelon, Lasix IV, Lovenox, Maxipime IV,

Pulmicort, Solu-Medrol IV, Toprol, Tylenol and Ultram.



LABORATORY DATA:  He has a white count of 3.9, hemoglobin 13.5, hematocrit

40.6, platelets of 210.  INR is down to 2.8.  I will restart the Coumadin. 

He has a chemistry, 138 sodium, potassium 4.6, BUN is 30, creatinine 1.9,

GFR is 34.  Sugar is 143.  His calcium went from 11.7 to 12.4, now it is

12.6.  He is already on IV Solu-Medrol and IV Lasix.  Total bili is 0.5,

AST is 20, ALT is 26, alkaline phosphatase 51, total protein 6.5.  His BNP

was 7650.  Procalcitonin is 0.33.



He is being seen by Infectious Disease and Pulmonary.  He presented with

hypothermia, shortness of breath, tachycardia, hypertension, severe sepsis

secondary to healthcare-associated pneumonia.  He is on lots of IV

medications as per Infectious Disease and Pulmonary.  We will decrease his

Solu-Medrol a little bit.  Hopefully, get him out of bed to chair.  Restart

his Coumadin.  Check his labs tomorrow. Still a very poor prognosis overall.





__________________________________________

Tae Reed DO



DD:  10/21/2018 9:58:42

DT:  10/21/2018 10:00:15

Job # [id number]

DENISE

## 2018-10-22 NOTE — CON
DATE:  10/21/2018



LOCATION:  Room 270.



HISTORY OF PRESENT ILLNESS:  This is an 86-year-old male with known history

of longstanding COPD and admitted here with progressive shortness of breath

and has been started on IV steroid therapy and is now being referred for

endocrine evaluation of persistent hypercalcemia.



PAST MEDICAL HISTORY:  Significant history of lung carcinoma, the exact

details are not known at this time; history of chronic obstructive lung

disease with previous admissions for exacerbations of bronchitis and

emphysema with chronic cough; history of cardiac tachyarrhythmias with also

significant history of a right leg peripheral arterial vasculopathy,

currently on Coumadin therapy.



FAMILY HISTORY:  Positive for hypertension and heart disease.



SOCIAL HISTORY:  The patient has supportive family.  No known substance

use.



REVIEW OF SYSTEMS:  As mentioned above.  Admits to generalized body

weakness with easy fatigability and tiredness and suboptimal energy level. 

Also admits to progressive bouts of dizziness and lightheadedness, worse on

the day of admission.  No chest pains or palpitations, but admits to

progressive shortness of breath, initially on exertion and then at rest

with paroxysmal nocturnal dyspnea.  Also admits to nausea, dyspepsia, and

vague upper abdominal pains with habitual constipation.



PHYSICAL EXAMINATION

GENERAL:  This is an average-built male, in no apparent distress.

VITAL SIGNS:  Blood pressure of 140/80, pulse of 100 beats per minute and

regular, temperature 98, respirations 20, height is 6 feet, weight is 145

pounds.

HEENT:  Head, normocephalic.  Eyes anicteric with pink conjunctivae. 

Funduscopy not possible at this time.  Ears, nose, and throat otherwise

normal.

NECK:  Supple.  Thyroid gland is normal size.  No carotid bruits or

cervical adenopathy.

CARDIOPULMONARY:  Some adynamic precordium.  S1, S2 is rapid and regular.

LUNGS:  Clear to auscultation.

ABDOMEN:  Flat, soft with positive bowel sounds.

EXTREMITIES:  No peripheral edema.  Pulses are +2 bilaterally.



LABORATORY DATA:  Chemistry showed a BUN initially of 24, sodium 141,

potassium 4.3, chloride 112, CO2 16, glucose 135, and creatinine 1.5.  His

calcium levels have ranged from 11.7 to 12.4 and 12.6 mg/dL.  His ProBNP is

7650.



ASSESSMENT:  This is an 86-year-old male with acute exacerbation of chronic

obstructive pulmonary disease, currently on intravenous steroid therapy and

also has persistent hypercalcemia, and the possibility always of whether we

are dealing with a parathyroid versus non-parathyroid etiology has to be

ascertained at this time.  With a significant history of lung carcinoma, we

have to exclude any underlying osteolytic bone metastases versus humoral

hypercalcemia of malignancy causing the aforementioned persistent

hypercalcemia.



PLAN OF MANAGEMENT:  We will obtain a parathyroid hormone intact level and

also a PTH-related peptide or protein to ascertain the aforementioned.  A

serum magnesium and phosphorus and also a 25-hydroxy vitamin D level will

be obtained for tomorrow.  We will obtain a baseline TSH and lipid panel as

ordered.  We will follow and advise accordingly.







__________________________________________

Jocelyne Soni MD





DD:  10/21/2018 16:13:58

DT:  10/21/2018 16:17:13

Job # [id number]

## 2018-10-22 NOTE — RAD
Date of service: 



10/21/2018



HISTORY:

 code blue 



COMPARISON:

10/19/2018. 



FINDINGS:



LUNGS:

Pulmonary vascular congestion, multifocal infiltrates right greater 

than left.  Poorly visualize left lower lobe consolidative change 

again identified.



PLEURA:

Stable bilateral pleural effusions.



Small right pneumothorax. The finding is marked on the study for 

review.  



CARDIOVASCULAR:

Atherosclerotic calcifications identified primarily aortic arch.



Normal.



OSSEOUS STRUCTURES:

No significant abnormalities.



VISUALIZED UPPER ABDOMEN:

Normal.



OTHER FINDINGS:

Satisfactory position of recently placed endotracheal tube.



IMPRESSION:

Multifocal infiltrates are progressive.



Satisfactory position of endotracheal tube.



Questionable small right apical pneumothorax. 



Communication of results: 



I discussed findings with Dr. Espinoza at the time of this 

interpretation